# Patient Record
Sex: FEMALE | Race: WHITE | Employment: UNEMPLOYED | URBAN - METROPOLITAN AREA
[De-identification: names, ages, dates, MRNs, and addresses within clinical notes are randomized per-mention and may not be internally consistent; named-entity substitution may affect disease eponyms.]

---

## 2017-06-04 ENCOUNTER — ED HISTORICAL/CONVERTED ENCOUNTER (OUTPATIENT)
Dept: OTHER | Age: 32
End: 2017-06-04

## 2018-02-20 ENCOUNTER — OP HISTORICAL/CONVERTED ENCOUNTER (OUTPATIENT)
Dept: OTHER | Age: 33
End: 2018-02-20

## 2019-02-21 ENCOUNTER — ANESTHESIA (OUTPATIENT)
Dept: MEDSURG UNIT | Age: 34
End: 2019-02-21
Payer: MEDICAID

## 2019-02-21 ENCOUNTER — ANESTHESIA EVENT (OUTPATIENT)
Dept: MEDSURG UNIT | Age: 34
End: 2019-02-21
Payer: MEDICAID

## 2019-02-21 ENCOUNTER — HOSPITAL ENCOUNTER (OUTPATIENT)
Age: 34
Setting detail: OUTPATIENT SURGERY
Discharge: HOME | End: 2019-02-21
Attending: PODIATRIST | Admitting: PODIATRIST
Payer: MEDICAID

## 2019-02-21 VITALS
HEIGHT: 66 IN | WEIGHT: 177.69 LBS | RESPIRATION RATE: 16 BRPM | BODY MASS INDEX: 28.56 KG/M2 | OXYGEN SATURATION: 98 % | HEART RATE: 58 BPM | DIASTOLIC BLOOD PRESSURE: 65 MMHG | TEMPERATURE: 97.5 F | SYSTOLIC BLOOD PRESSURE: 104 MMHG

## 2019-02-21 PROCEDURE — 74011250636 HC RX REV CODE- 250/636

## 2019-02-21 PROCEDURE — 74011250636 HC RX REV CODE- 250/636: Performed by: PODIATRIST

## 2019-02-21 PROCEDURE — 76210000046 HC AMBSU PH II REC FIRST 0.5 HR: Performed by: PODIATRIST

## 2019-02-21 PROCEDURE — 77030013479 HC CUF TRNQ COT DGT MARM -A: Performed by: PODIATRIST

## 2019-02-21 PROCEDURE — 76030000002 HC AMB SURG OR TIME FIRST 0.: Performed by: PODIATRIST

## 2019-02-21 PROCEDURE — 76210000038 HC AMBSU PH I REC 2.5 TO 3 HR: Performed by: PODIATRIST

## 2019-02-21 PROCEDURE — 74011000250 HC RX REV CODE- 250: Performed by: PODIATRIST

## 2019-02-21 PROCEDURE — 77030018836 HC SOL IRR NACL ICUM -A: Performed by: PODIATRIST

## 2019-02-21 PROCEDURE — 76060000073 HC AMB SURG ANES FIRST 0.5 HR: Performed by: PODIATRIST

## 2019-02-21 PROCEDURE — 74011000250 HC RX REV CODE- 250

## 2019-02-21 PROCEDURE — 77030020782 HC GWN BAIR PAWS FLX 3M -B

## 2019-02-21 PROCEDURE — 77030006689 HC BLD OPHTH BVR BD -A: Performed by: PODIATRIST

## 2019-02-21 RX ORDER — FENTANYL CITRATE 50 UG/ML
50 INJECTION, SOLUTION INTRAMUSCULAR; INTRAVENOUS AS NEEDED
Status: DISCONTINUED | OUTPATIENT
Start: 2019-02-21 | End: 2019-02-21 | Stop reason: HOSPADM

## 2019-02-21 RX ORDER — ONDANSETRON 2 MG/ML
4 INJECTION INTRAMUSCULAR; INTRAVENOUS AS NEEDED
Status: DISCONTINUED | OUTPATIENT
Start: 2019-02-21 | End: 2019-02-21 | Stop reason: HOSPADM

## 2019-02-21 RX ORDER — LAMOTRIGINE 25 MG/1
50 TABLET ORAL DAILY
COMMUNITY

## 2019-02-21 RX ORDER — ONDANSETRON 2 MG/ML
INJECTION INTRAMUSCULAR; INTRAVENOUS AS NEEDED
Status: DISCONTINUED | OUTPATIENT
Start: 2019-02-21 | End: 2019-02-21 | Stop reason: HOSPADM

## 2019-02-21 RX ORDER — VENLAFAXINE 37.5 MG/1
37.5 TABLET ORAL 3 TIMES DAILY
COMMUNITY
End: 2022-03-28 | Stop reason: ALTCHOICE

## 2019-02-21 RX ORDER — DEXMEDETOMIDINE HYDROCHLORIDE 4 UG/ML
INJECTION, SOLUTION INTRAVENOUS AS NEEDED
Status: DISCONTINUED | OUTPATIENT
Start: 2019-02-21 | End: 2019-02-21 | Stop reason: HOSPADM

## 2019-02-21 RX ORDER — SODIUM CHLORIDE 0.9 % (FLUSH) 0.9 %
5-40 SYRINGE (ML) INJECTION EVERY 8 HOURS
Status: DISCONTINUED | OUTPATIENT
Start: 2019-02-21 | End: 2019-02-21 | Stop reason: HOSPADM

## 2019-02-21 RX ORDER — LIDOCAINE HYDROCHLORIDE 20 MG/ML
INJECTION, SOLUTION EPIDURAL; INFILTRATION; INTRACAUDAL; PERINEURAL AS NEEDED
Status: DISCONTINUED | OUTPATIENT
Start: 2019-02-21 | End: 2019-02-21 | Stop reason: HOSPADM

## 2019-02-21 RX ORDER — ESTRADIOL 0.1 MG/D
1 PATCH TRANSDERMAL 2 TIMES WEEKLY
COMMUNITY
End: 2022-03-28 | Stop reason: ALTCHOICE

## 2019-02-21 RX ORDER — IBUPROFEN 800 MG/1
TABLET ORAL
COMMUNITY
End: 2022-09-19

## 2019-02-21 RX ORDER — MIDAZOLAM HYDROCHLORIDE 1 MG/ML
0.5 INJECTION, SOLUTION INTRAMUSCULAR; INTRAVENOUS
Status: DISCONTINUED | OUTPATIENT
Start: 2019-02-21 | End: 2019-02-21 | Stop reason: HOSPADM

## 2019-02-21 RX ORDER — SODIUM CHLORIDE 0.9 % (FLUSH) 0.9 %
5-40 SYRINGE (ML) INJECTION AS NEEDED
Status: DISCONTINUED | OUTPATIENT
Start: 2019-02-21 | End: 2019-02-21 | Stop reason: HOSPADM

## 2019-02-21 RX ORDER — LIDOCAINE HYDROCHLORIDE 10 MG/ML
0.1 INJECTION, SOLUTION EPIDURAL; INFILTRATION; INTRACAUDAL; PERINEURAL AS NEEDED
Status: DISCONTINUED | OUTPATIENT
Start: 2019-02-21 | End: 2019-02-21 | Stop reason: HOSPADM

## 2019-02-21 RX ORDER — FLUVOXAMINE MALEATE 100 MG/1
100 TABLET, COATED ORAL EVERY EVENING
COMMUNITY

## 2019-02-21 RX ORDER — FENTANYL CITRATE 50 UG/ML
25 INJECTION, SOLUTION INTRAMUSCULAR; INTRAVENOUS
Status: DISCONTINUED | OUTPATIENT
Start: 2019-02-21 | End: 2019-02-21 | Stop reason: HOSPADM

## 2019-02-21 RX ORDER — CLINDAMYCIN PHOSPHATE 600 MG/50ML
600 INJECTION INTRAVENOUS ONCE
Status: COMPLETED | OUTPATIENT
Start: 2019-02-21 | End: 2019-02-21

## 2019-02-21 RX ORDER — OXYCODONE HYDROCHLORIDE 5 MG/1
5 TABLET ORAL AS NEEDED
Status: DISCONTINUED | OUTPATIENT
Start: 2019-02-21 | End: 2019-02-21 | Stop reason: HOSPADM

## 2019-02-21 RX ORDER — PROPOFOL 10 MG/ML
INJECTION, EMULSION INTRAVENOUS AS NEEDED
Status: DISCONTINUED | OUTPATIENT
Start: 2019-02-21 | End: 2019-02-21 | Stop reason: HOSPADM

## 2019-02-21 RX ORDER — SODIUM CHLORIDE, SODIUM LACTATE, POTASSIUM CHLORIDE, CALCIUM CHLORIDE 600; 310; 30; 20 MG/100ML; MG/100ML; MG/100ML; MG/100ML
INJECTION, SOLUTION INTRAVENOUS
Status: DISCONTINUED | OUTPATIENT
Start: 2019-02-21 | End: 2019-02-21 | Stop reason: HOSPADM

## 2019-02-21 RX ORDER — MORPHINE SULFATE 10 MG/ML
2 INJECTION, SOLUTION INTRAMUSCULAR; INTRAVENOUS
Status: DISCONTINUED | OUTPATIENT
Start: 2019-02-21 | End: 2019-02-21 | Stop reason: HOSPADM

## 2019-02-21 RX ORDER — DEXAMETHASONE SODIUM PHOSPHATE 4 MG/ML
INJECTION, SOLUTION INTRA-ARTICULAR; INTRALESIONAL; INTRAMUSCULAR; INTRAVENOUS; SOFT TISSUE AS NEEDED
Status: DISCONTINUED | OUTPATIENT
Start: 2019-02-21 | End: 2019-02-21 | Stop reason: HOSPADM

## 2019-02-21 RX ORDER — QUETIAPINE FUMARATE 25 MG/1
753 TABLET, FILM COATED ORAL
COMMUNITY
End: 2022-03-28 | Stop reason: ALTCHOICE

## 2019-02-21 RX ORDER — SODIUM CHLORIDE, SODIUM LACTATE, POTASSIUM CHLORIDE, CALCIUM CHLORIDE 600; 310; 30; 20 MG/100ML; MG/100ML; MG/100ML; MG/100ML
125 INJECTION, SOLUTION INTRAVENOUS CONTINUOUS
Status: DISCONTINUED | OUTPATIENT
Start: 2019-02-21 | End: 2019-02-21 | Stop reason: HOSPADM

## 2019-02-21 RX ORDER — MIDAZOLAM HYDROCHLORIDE 1 MG/ML
1 INJECTION, SOLUTION INTRAMUSCULAR; INTRAVENOUS AS NEEDED
Status: DISCONTINUED | OUTPATIENT
Start: 2019-02-21 | End: 2019-02-21 | Stop reason: HOSPADM

## 2019-02-21 RX ORDER — DIPHENHYDRAMINE HYDROCHLORIDE 50 MG/ML
12.5 INJECTION, SOLUTION INTRAMUSCULAR; INTRAVENOUS AS NEEDED
Status: DISCONTINUED | OUTPATIENT
Start: 2019-02-21 | End: 2019-02-21 | Stop reason: HOSPADM

## 2019-02-21 RX ORDER — SODIUM CHLORIDE 9 MG/ML
25 INJECTION, SOLUTION INTRAVENOUS CONTINUOUS
Status: DISCONTINUED | OUTPATIENT
Start: 2019-02-21 | End: 2019-02-21 | Stop reason: HOSPADM

## 2019-02-21 RX ORDER — SILVER SULFADIAZINE 10 G/1000G
CREAM TOPICAL DAILY
Status: COMPLETED | OUTPATIENT
Start: 2019-02-22 | End: 2019-02-21

## 2019-02-21 RX ORDER — MIDAZOLAM HYDROCHLORIDE 1 MG/ML
INJECTION, SOLUTION INTRAMUSCULAR; INTRAVENOUS AS NEEDED
Status: DISCONTINUED | OUTPATIENT
Start: 2019-02-21 | End: 2019-02-21 | Stop reason: HOSPADM

## 2019-02-21 RX ORDER — FENTANYL CITRATE 50 UG/ML
INJECTION, SOLUTION INTRAMUSCULAR; INTRAVENOUS AS NEEDED
Status: DISCONTINUED | OUTPATIENT
Start: 2019-02-21 | End: 2019-02-21 | Stop reason: HOSPADM

## 2019-02-21 RX ORDER — SODIUM CHLORIDE, SODIUM LACTATE, POTASSIUM CHLORIDE, CALCIUM CHLORIDE 600; 310; 30; 20 MG/100ML; MG/100ML; MG/100ML; MG/100ML
25 INJECTION, SOLUTION INTRAVENOUS CONTINUOUS
Status: DISCONTINUED | OUTPATIENT
Start: 2019-02-21 | End: 2019-02-21 | Stop reason: HOSPADM

## 2019-02-21 RX ORDER — HYDROMORPHONE HYDROCHLORIDE 1 MG/ML
0.2 INJECTION, SOLUTION INTRAMUSCULAR; INTRAVENOUS; SUBCUTANEOUS
Status: DISCONTINUED | OUTPATIENT
Start: 2019-02-21 | End: 2019-02-21 | Stop reason: HOSPADM

## 2019-02-21 RX ADMIN — PROPOFOL 50 MG: 10 INJECTION, EMULSION INTRAVENOUS at 13:07

## 2019-02-21 RX ADMIN — CLINDAMYCIN PHOSPHATE 600 MG: 600 INJECTION, SOLUTION INTRAVENOUS at 13:09

## 2019-02-21 RX ADMIN — FENTANYL CITRATE 25 MCG: 50 INJECTION, SOLUTION INTRAMUSCULAR; INTRAVENOUS at 13:07

## 2019-02-21 RX ADMIN — PROPOFOL 50 MG: 10 INJECTION, EMULSION INTRAVENOUS at 13:05

## 2019-02-21 RX ADMIN — LIDOCAINE HYDROCHLORIDE 40 MG: 20 INJECTION, SOLUTION EPIDURAL; INFILTRATION; INTRACAUDAL; PERINEURAL at 13:05

## 2019-02-21 RX ADMIN — MIDAZOLAM HYDROCHLORIDE 3 MG: 1 INJECTION, SOLUTION INTRAMUSCULAR; INTRAVENOUS at 12:59

## 2019-02-21 RX ADMIN — FENTANYL CITRATE 25 MCG: 50 INJECTION, SOLUTION INTRAMUSCULAR; INTRAVENOUS at 13:22

## 2019-02-21 RX ADMIN — FENTANYL CITRATE 25 MCG: 50 INJECTION, SOLUTION INTRAMUSCULAR; INTRAVENOUS at 13:09

## 2019-02-21 RX ADMIN — MIDAZOLAM HYDROCHLORIDE 2 MG: 1 INJECTION, SOLUTION INTRAMUSCULAR; INTRAVENOUS at 13:05

## 2019-02-21 RX ADMIN — SODIUM CHLORIDE, SODIUM LACTATE, POTASSIUM CHLORIDE, CALCIUM CHLORIDE: 600; 310; 30; 20 INJECTION, SOLUTION INTRAVENOUS at 12:45

## 2019-02-21 RX ADMIN — PROPOFOL 50 MG: 10 INJECTION, EMULSION INTRAVENOUS at 13:09

## 2019-02-21 RX ADMIN — DEXAMETHASONE SODIUM PHOSPHATE 4 MG: 4 INJECTION, SOLUTION INTRA-ARTICULAR; INTRALESIONAL; INTRAMUSCULAR; INTRAVENOUS; SOFT TISSUE at 13:12

## 2019-02-21 RX ADMIN — ONDANSETRON 4 MG: 2 INJECTION INTRAMUSCULAR; INTRAVENOUS at 13:12

## 2019-02-21 RX ADMIN — DEXMEDETOMIDINE HYDROCHLORIDE 4 MCG: 4 INJECTION, SOLUTION INTRAVENOUS at 13:16

## 2019-02-21 NOTE — ANESTHESIA PREPROCEDURE EVALUATION
Anesthetic History No history of anesthetic complications Review of Systems / Medical History Patient summary reviewed, nursing notes reviewed and pertinent labs reviewed Pulmonary Within defined limits Neuro/Psych Within defined limits Cardiovascular Exercise tolerance: >4 METS 
  
GI/Hepatic/Renal 
  
GERD Endo/Other Within defined limits Other Findings Physical Exam 
 
Airway Mallampati: I 
TM Distance: > 6 cm Neck ROM: normal range of motion Mouth opening: Normal 
 
 Cardiovascular Rhythm: regular Rate: normal 
 
 
 
 Dental 
No notable dental hx Pulmonary Breath sounds clear to auscultation Abdominal 
 
 
 
 Other Findings Anesthetic Plan ASA: 2 Anesthesia type: MAC Induction: Intravenous Anesthetic plan and risks discussed with: Patient

## 2019-02-21 NOTE — PERIOP NOTES
Patient remains extremely sleepy. Blood pressure increasing to 100/55. When awakened patient states \" I  doesn't sleep at night so that is why I am so tired. \"

## 2019-02-21 NOTE — PERIOP NOTES
tourni-cot used for tourniquet  Left foot on @ 1316, off @ 1323  Right foot on @ 1316, off  @  [phone]

## 2019-02-21 NOTE — ANESTHESIA POSTPROCEDURE EVALUATION
Post-Anesthesia Evaluation and Assessment Patient: Tom Simth MRN: [id number]  SSN: [id number] YOB: 1985  Age: 35 y.o. Sex: female I have evaluated the patient and they are stable and ready for discharge from the PACU. Cardiovascular Function/Vital Signs Visit Vitals BP 91/58 Pulse (!) 40 Temp 36.4 °C (97.5 °F) Resp 17 Ht 5' 6\" (1.676 m) Wt 80.6 kg (177 lb 11.1 oz) SpO2 98% BMI 28.68 kg/m² Patient is status post MAC anesthesia for Procedure(s): MATRIXECTOMY OF BILATERAL BIG TOES. Nausea/Vomiting: None Postoperative hydration reviewed and adequate. Pain: 
Pain Scale 1: Numeric (0 - 10) (02/21/19 1329) Pain Intensity 1: 0 (02/21/19 1329) Managed Neurological Status:  
Neuro (WDL): Exceptions to WDL (02/21/19 1337) Neuro Neurologic State: Sleeping;Eyes open to stimulus (02/21/19 1337) At baseline Mental Status, Level of Consciousness: Alert and  oriented to person, place, and time Pulmonary Status:  
O2 Device: Nasal cannula (02/21/19 1332) Adequate oxygenation and airway patent Complications related to anesthesia: None Post-anesthesia assessment completed. No concerns Signed By: Jose Nails MD   
 February 21, 2019 Procedure(s): MATRIXECTOMY OF BILATERAL BIG TOES. 
 
<BSHSIANPOST> Visit Vitals BP 91/58 Pulse (!) 40 Temp 36.4 °C (97.5 °F) Resp 17 Ht 5' 6\" (1.676 m) Wt 80.6 kg (177 lb 11.1 oz) SpO2 98% BMI 28.68 kg/m²

## 2019-02-21 NOTE — BRIEF OP NOTE
BRIEF OPERATIVE NOTE    Date of Procedure: 2/21/2019   Preoperative Diagnosis:  BILATERAL INGROWN NAILS  Postoperative Diagnosis:  BILATERAL INGROWN NAILS    Procedure(s):  MATRIXECTOMY OF BILATERAL BIG TOES  Surgeon(s) and Role:     Lyla Montano MD - Primary         Surgical Assistant: none    Surgical Staff:  Circ-1: Arturo Laws RN  Scrub RN-1: Latesha Brown RN  Event Time In Time Out   Incision Start 1316    Incision Close 1323      Anesthesia: MAC   Estimated Blood Loss: occ  Specimens: * No specimens in log *   Findings: Ingrown nails right and left big toes   Complications:  none  Implants: * No implants in log *

## 2019-02-21 NOTE — DISCHARGE INSTRUCTIONS
[address] [place] [address], [facility] [address]        Phone: [phone]   Fax: [phone]    Megha Salgado D.P.M., D.P.M. INSTRUCTIONS FOR CARE OF POST OPERATIVE TOENAIL SURGERY AND GENERAL INFORMATION        1. Go directly home and elevate your foot/feet and rest for the first day or two.    2.  Take prescribed pain medication as needed. If the pain is mild, you may take either Tylenol or Aspirin. Your toenail(s) has/have been partially/totally removed. The numbness from the injection(s) should last between six to eight hours. After the feeling returns, you MAY or MAY NOT have pain severe enough to require medication. It is very important that you take any prescribed medication. You must take it as instructed by your doctor and DO NOT DRINK ALCOHOL or [address]. You may notice some blood on the dressings. Please do not be alarmed. This is normal.    Your nail root has been chemically burned and like all burns, the surgical site will drain and ooze for 4-6 weeks after surgery. In some cases, this may occur for longer than  6 weeks. The nail lips may also become red/swollen. When you start your soaking, please remember to use clean gauze and Q-tips to wipe the debris (dead tissue/scab); this will be uncomfortable the first couple of times. (SEE EPSOM SALT SOAK INSTRUCTIONS)    You must wear sandals, flip-flops, or old shoes that can be cut out for the first few days while the toe(s) are tender. Too much pressure can cause pain. You may and should become as physically active as possible after the first 2-3 days. The \"healing time\" varies considerable, as does the amount of pain you may have.  Swelling of the whole foot, heat, red streaks up the foot/leg, calf pain and/or tender lumps behind the knee/gt·oin, severe bleeding or feeling ill may be serious signs and you should contact your doctor immediately if any of the symptoms should occur. If you have any other questions concerning your nail procedure, please do not hesitate to contact the office for assistance at [phone]. EPSOM SALT SOAK INSTRUCTIONS        1. On the day after your surgery, remove the dressing and the packing gauze in the nail(s) groove and then soak in [facility] water. 2.  Put two (2) tablespoons of Epsom Salt in one (1) quart of warm water,  NOT HOT. 3.  Soak your foot/feet for 20 minutes each time. 4.  You must do this twice a day. 5.  Always make a fresh solution for each soak. 6.  Apply 1, 2, or 3 drops of Cortic-ND solution to the surgical site(s). 7.  Cover surgical site(s) with a clean Band-aid after each soak. 8.  Please note that as a safeguard to you, your specimen will be sent to a pathology laboratory for examination. You may shower/bathe after you have removed the original dressing. However, you must soak in [facility] after every shower/bath. EMERGENCY CONTACT:  BEEPER #[phone]        DISCHARGE SUMMARY from Nurse    PATIENT INSTRUCTIONS:    After general anesthesia or intravenous sedation, for 24 hours or while taking prescription Narcotics:  · Limit your activities  · Do not drive and operate hazardous machinery  · Do not make important personal or business decisions  · Do  not drink alcoholic beverages  · If you have not urinated within 8 hours after discharge, please contact your surgeon on call.     Report the following to your surgeon:  · Excessive pain, swelling, redness or odor of or around the surgical area  · Temperature over 100.5  · Nausea and vomiting lasting longer than 4 hours or if unable to take medications  · Any signs of decreased circulation or nerve impairment to extremity: change in color, persistent  numbness, tingling, coldness or increase pain  · Any questions    What to do at Home:  Recommended activity: Activity as tolerated and no driving for today and No driving while on analgesics,     If you experience any of the following symptoms ; as noted above, please follow up with . *  Please give a list of your current medications to your Primary Care Provider. *  Please update this list whenever your medications are discontinued, doses are      changed, or new medications (including over-the-counter products) are added. *  Please carry medication information at all times in case of emergency situations. These are general instructions for a healthy lifestyle:    No smoking/ No tobacco products/ Avoid exposure to second hand smoke  Surgeon General's Warning:  Quitting smoking now greatly reduces serious risk to your health. Obesity, smoking, and sedentary lifestyle greatly increases your risk for illness    A healthy diet, regular physical exercise & weight monitoring are important for maintaining a healthy lifestyle    You may be retaining fluid if you have a history of heart failure or if you experience any of the following symptoms:  Weight gain of 3 pounds or more overnight or 5 pounds in a week, increased swelling in our hands or feet or shortness of breath while lying flat in bed. Please call your doctor as soon as you notice any of these symptoms; do not wait until your next office visit. Recognize signs and symptoms of STROKE:    F-face looks uneven    A-arms unable to move or move unevenly    S-speech slurred or non-existent    T-time-call 911 as soon as signs and symptoms begin-DO NOT go       Back to bed or wait to see if you get better-TIME IS BRAIN. Warning Signs of HEART ATTACK     Call 911 if you have these symptoms:   Chest discomfort. Most heart attacks involve discomfort in the center of the chest that lasts more than a few minutes, or that goes away and comes back. It can feel like uncomfortable pressure, squeezing, fullness, or pain.    Discomfort in other areas of the upper body. Symptoms can include pain or discomfort in one or both arms, the back, neck, jaw, or stomach.  Shortness of breath with or without chest discomfort.  Other signs may include breaking out in a cold sweat, nausea, or lightheadedness. Don't wait more than five minutes to call 911 - MINUTES MATTER! Fast action can save your life. Calling 911 is almost always the fastest way to get lifesaving treatment. Emergency Medical Services staff can begin treatment when they arrive -- up to an hour sooner than if someone gets to the hospital by car. The discharge information has been reviewed with the patient and caregiver. The patient and caregiver verbalized understanding. Discharge medications reviewed with the patient and caregiver and appropriate educational materials and side effects teaching were provided.   ___________________________________________________________________________________________________________________________________

## 2019-02-22 NOTE — OP NOTES
[address]  OPERATIVE REPORT    Name:  Susana Anthony  MR#:  [id number]  :  1985  ACCOUNT #:  [de-identified]  DATE OF SERVICE:  2019      PREOPERATIVE DIAGNOSIS:  Chronic and painful ingrown toenails of the right and left great toes, medial and lateral borders. POSTOPERATIVE DIAGNOSIS:  Chronic and painful ingrown toenails of the right and left great toes, medial and lateral borders. PROCEDURE PERFORMED:  Matrixectomy of the right and left great toes, medial and lateral borders. SURGEON:  Iris Castro MD    FIRST ASSISTANT:  None. SECOND ASSISTANT:  None. ANESTHESIA:  Marcaine and Xylocaine mixture along with IV sedation. ANESTHETIST:  Dr. Tamy Cazares along with a nurse anesthetist.    SCRUB NURSE:  Abe Hubbard. SECOND NURSE:  Pennie Schaeffer. COMPLICATIONS:  None. ESTIMATED BLOOD LOSS:  0 mL. TOURNIQUET:  A Tourni-Cot anchored to the base of the right and left great toes. TOURNIQUET TIME:  Approximately 8 minutes on each toes. SPECIMENS REMOVED:  Ingrown toenails of the right and left great toes, medial and lateral borders. IMPLANTS:  None. INDICATIONS FOR SURGERY:  This 80-year-old female presents with a history of very chronic and painful ingrown toenails of the great toes, medial and lateral borders. The patient states the condition had been there for a number of years causing her debilitation and pain upon ambulating and wearing shoes. Conservative therapy such as use of wide fitting shoes, use of over-the-counter medications have not remedied her condition asymptomatic. The patient elected to have surgical intervention. DESCRIPTION OF OPERATION:  After there was no contraindication to this patient getting surgery, she was brought to [facility] Unit on the 7th floor, and placed in dorsal recumbent position.   Anesthesia was administered using Marcaine and Xylocaine mixture infiltrated on the base of the right and left great toes. Also IV anesthesia was administered by the Anesthesia Department. Approximately, 10 mL of Marcaine and Xylocaine mixture was utilized to both the right and left great toes. Both great toes were then prepped in the usual sterile manner. Hemostasis was achieved using a Tourni-Cot anchored to the base of the great toes. The toes were then fully exsanguinated. Attention was directed to the right great toe over the medial and lateral borders using a Nail Connie elevator was then used to elevate the hyper and hyponychium. An English anvil was then used to cut the nail to its most proximal portion. A 62 blade was used to complete the excision of nail roots and a mosquito hemostat was then used to remove the offending nail borders. The remaining nail borders were then bluntly dissected and suctioned out. Application of carbolic acid or phenol was applied to the nail root, medial and lateral borders at 30-second intervals at 3 intervals. Attention was then directed to the left great toe of the medial and lateral borders where similar procedure was performed. All wounds were then thoroughly irrigated with alcohol, packed with iodoform gauze and Silvadene cream.  Application of dressings of 3x3's, 2-inch Berna and 2-inch Ace Bandage. The Tourni-Cots were released and perfusion to the great toes were noted. The patient tolerated the anesthesia and procedure well, left the operating room to the recovery room in fine condition where she was advised on the postoperative care, elevation and ice for the first 72 hours, pain meds, Tylenol over-the-counter to be taken and strict postoperative instructions. The patient advised to follow in the office in three days.         EARNEST SHAH MD [id number]  D:  02/21/2019 16:55  T:  02/22/2019 4:07  JOB #:  [id number]  CC:  Aparna Cabral MD

## 2019-05-22 RX ORDER — CEFAZOLIN SODIUM/WATER 2 G/20 ML
2 SYRINGE (ML) INTRAVENOUS ONCE
Status: CANCELLED | OUTPATIENT
Start: 2019-05-22 | End: 2019-05-22

## 2019-05-23 ENCOUNTER — ANESTHESIA (OUTPATIENT)
Dept: MEDSURG UNIT | Age: 34
End: 2019-05-23
Payer: MEDICAID

## 2019-05-23 ENCOUNTER — HOSPITAL ENCOUNTER (OUTPATIENT)
Age: 34
Setting detail: OUTPATIENT SURGERY
Discharge: HOME | End: 2019-05-23
Attending: PODIATRIST | Admitting: PODIATRIST
Payer: MEDICAID

## 2019-05-23 ENCOUNTER — ANESTHESIA EVENT (OUTPATIENT)
Dept: MEDSURG UNIT | Age: 34
End: 2019-05-23
Payer: MEDICAID

## 2019-05-23 VITALS
HEART RATE: 56 BPM | OXYGEN SATURATION: 99 % | SYSTOLIC BLOOD PRESSURE: 98 MMHG | TEMPERATURE: 97.5 F | DIASTOLIC BLOOD PRESSURE: 61 MMHG | RESPIRATION RATE: 11 BRPM

## 2019-05-23 DIAGNOSIS — M77.51 BONE SPUR OF TOE OF RIGHT FOOT: Primary | ICD-10-CM

## 2019-05-23 PROBLEM — M77.52 BONE SPUR OF TOE OF LEFT FOOT: Status: ACTIVE | Noted: 2019-05-23

## 2019-05-23 LAB
HCG UR QL: NEGATIVE
HGB BLD-MCNC: 13.2 G/DL (ref 11.5–16)

## 2019-05-23 PROCEDURE — 77030002916 HC SUT ETHLN J&J -A: Performed by: PODIATRIST

## 2019-05-23 PROCEDURE — 76030000001 HC AMB SURG OR TIME 1 TO 1.5: Performed by: PODIATRIST

## 2019-05-23 PROCEDURE — 76060000062 HC AMB SURG ANES 1 TO 1.5 HR: Performed by: PODIATRIST

## 2019-05-23 PROCEDURE — 74011000250 HC RX REV CODE- 250

## 2019-05-23 PROCEDURE — 74011250636 HC RX REV CODE- 250/636: Performed by: ANESTHESIOLOGY

## 2019-05-23 PROCEDURE — 77030000032 HC CUF TRNQT ZIMM -B: Performed by: PODIATRIST

## 2019-05-23 PROCEDURE — 74011250636 HC RX REV CODE- 250/636

## 2019-05-23 PROCEDURE — 85018 HEMOGLOBIN: CPT

## 2019-05-23 PROCEDURE — 77030031139 HC SUT VCRL2 J&J -A: Performed by: PODIATRIST

## 2019-05-23 PROCEDURE — 74011000250 HC RX REV CODE- 250: Performed by: PODIATRIST

## 2019-05-23 PROCEDURE — 77030018836 HC SOL IRR NACL ICUM -A: Performed by: PODIATRIST

## 2019-05-23 PROCEDURE — 74011250637 HC RX REV CODE- 250/637: Performed by: ANESTHESIOLOGY

## 2019-05-23 PROCEDURE — 77030004427: Performed by: PODIATRIST

## 2019-05-23 PROCEDURE — 76210000037 HC AMBSU PH I REC 2 TO 2.5 HR: Performed by: PODIATRIST

## 2019-05-23 PROCEDURE — 77030020782 HC GWN BAIR PAWS FLX 3M -B

## 2019-05-23 PROCEDURE — 74011250636 HC RX REV CODE- 250/636: Performed by: PODIATRIST

## 2019-05-23 PROCEDURE — 81025 URINE PREGNANCY TEST: CPT

## 2019-05-23 RX ORDER — SODIUM CHLORIDE 0.9 % (FLUSH) 0.9 %
5-40 SYRINGE (ML) INJECTION AS NEEDED
Status: DISCONTINUED | OUTPATIENT
Start: 2019-05-23 | End: 2019-05-23 | Stop reason: HOSPADM

## 2019-05-23 RX ORDER — BUPIVACAINE HYDROCHLORIDE 5 MG/ML
INJECTION, SOLUTION EPIDURAL; INTRACAUDAL AS NEEDED
Status: DISCONTINUED | OUTPATIENT
Start: 2019-05-23 | End: 2019-05-23 | Stop reason: HOSPADM

## 2019-05-23 RX ORDER — SODIUM CHLORIDE, SODIUM LACTATE, POTASSIUM CHLORIDE, CALCIUM CHLORIDE 600; 310; 30; 20 MG/100ML; MG/100ML; MG/100ML; MG/100ML
125 INJECTION, SOLUTION INTRAVENOUS CONTINUOUS
Status: DISCONTINUED | OUTPATIENT
Start: 2019-05-23 | End: 2019-05-23 | Stop reason: HOSPADM

## 2019-05-23 RX ORDER — OXYCODONE AND ACETAMINOPHEN 5; 325 MG/1; MG/1
1 TABLET ORAL AS NEEDED
Status: DISCONTINUED | OUTPATIENT
Start: 2019-05-23 | End: 2019-05-23 | Stop reason: HOSPADM

## 2019-05-23 RX ORDER — ONDANSETRON 2 MG/ML
4 INJECTION INTRAMUSCULAR; INTRAVENOUS AS NEEDED
Status: DISCONTINUED | OUTPATIENT
Start: 2019-05-23 | End: 2019-05-23 | Stop reason: HOSPADM

## 2019-05-23 RX ORDER — LIDOCAINE HYDROCHLORIDE 20 MG/ML
INJECTION, SOLUTION EPIDURAL; INFILTRATION; INTRACAUDAL; PERINEURAL AS NEEDED
Status: DISCONTINUED | OUTPATIENT
Start: 2019-05-23 | End: 2019-05-23 | Stop reason: HOSPADM

## 2019-05-23 RX ORDER — MORPHINE SULFATE 10 MG/ML
2 INJECTION, SOLUTION INTRAMUSCULAR; INTRAVENOUS
Status: DISCONTINUED | OUTPATIENT
Start: 2019-05-23 | End: 2019-05-23 | Stop reason: HOSPADM

## 2019-05-23 RX ORDER — MIDAZOLAM HYDROCHLORIDE 1 MG/ML
0.5 INJECTION, SOLUTION INTRAMUSCULAR; INTRAVENOUS
Status: DISCONTINUED | OUTPATIENT
Start: 2019-05-23 | End: 2019-05-23 | Stop reason: HOSPADM

## 2019-05-23 RX ORDER — OXYCODONE AND ACETAMINOPHEN 5; 325 MG/1; MG/1
1 TABLET ORAL
Qty: 30 TAB | Refills: 0 | Status: SHIPPED | OUTPATIENT
Start: 2019-05-23 | End: 2019-05-26

## 2019-05-23 RX ORDER — ONDANSETRON 2 MG/ML
INJECTION INTRAMUSCULAR; INTRAVENOUS AS NEEDED
Status: DISCONTINUED | OUTPATIENT
Start: 2019-05-23 | End: 2019-05-23 | Stop reason: HOSPADM

## 2019-05-23 RX ORDER — PROPOFOL 10 MG/ML
INJECTION, EMULSION INTRAVENOUS AS NEEDED
Status: DISCONTINUED | OUTPATIENT
Start: 2019-05-23 | End: 2019-05-23 | Stop reason: HOSPADM

## 2019-05-23 RX ORDER — DEXAMETHASONE SODIUM PHOSPHATE 4 MG/ML
INJECTION, SOLUTION INTRA-ARTICULAR; INTRALESIONAL; INTRAMUSCULAR; INTRAVENOUS; SOFT TISSUE AS NEEDED
Status: DISCONTINUED | OUTPATIENT
Start: 2019-05-23 | End: 2019-05-23 | Stop reason: HOSPADM

## 2019-05-23 RX ORDER — DICYCLOMINE HYDROCHLORIDE 10 MG/1
10 CAPSULE ORAL
COMMUNITY
End: 2022-03-28 | Stop reason: ALTCHOICE

## 2019-05-23 RX ORDER — DIPHENHYDRAMINE HYDROCHLORIDE 50 MG/ML
12.5 INJECTION, SOLUTION INTRAMUSCULAR; INTRAVENOUS AS NEEDED
Status: DISCONTINUED | OUTPATIENT
Start: 2019-05-23 | End: 2019-05-23 | Stop reason: HOSPADM

## 2019-05-23 RX ORDER — SODIUM CHLORIDE 0.9 % (FLUSH) 0.9 %
5-40 SYRINGE (ML) INJECTION EVERY 8 HOURS
Status: DISCONTINUED | OUTPATIENT
Start: 2019-05-23 | End: 2019-05-23 | Stop reason: HOSPADM

## 2019-05-23 RX ORDER — ACETAMINOPHEN 325 MG/1
650 TABLET ORAL ONCE
Status: COMPLETED | OUTPATIENT
Start: 2019-05-23 | End: 2019-05-23

## 2019-05-23 RX ORDER — SODIUM CHLORIDE 9 MG/ML
25 INJECTION, SOLUTION INTRAVENOUS CONTINUOUS
Status: DISCONTINUED | OUTPATIENT
Start: 2019-05-23 | End: 2019-05-23 | Stop reason: HOSPADM

## 2019-05-23 RX ORDER — DEXMEDETOMIDINE HYDROCHLORIDE 4 UG/ML
INJECTION, SOLUTION INTRAVENOUS AS NEEDED
Status: DISCONTINUED | OUTPATIENT
Start: 2019-05-23 | End: 2019-05-23 | Stop reason: HOSPADM

## 2019-05-23 RX ORDER — FENTANYL CITRATE 50 UG/ML
50 INJECTION, SOLUTION INTRAMUSCULAR; INTRAVENOUS AS NEEDED
Status: DISCONTINUED | OUTPATIENT
Start: 2019-05-23 | End: 2019-05-23 | Stop reason: HOSPADM

## 2019-05-23 RX ORDER — LIDOCAINE HYDROCHLORIDE 10 MG/ML
0.1 INJECTION, SOLUTION EPIDURAL; INFILTRATION; INTRACAUDAL; PERINEURAL AS NEEDED
Status: DISCONTINUED | OUTPATIENT
Start: 2019-05-23 | End: 2019-05-23 | Stop reason: HOSPADM

## 2019-05-23 RX ORDER — MIDAZOLAM HYDROCHLORIDE 1 MG/ML
1 INJECTION, SOLUTION INTRAMUSCULAR; INTRAVENOUS AS NEEDED
Status: DISCONTINUED | OUTPATIENT
Start: 2019-05-23 | End: 2019-05-23 | Stop reason: HOSPADM

## 2019-05-23 RX ORDER — FENTANYL CITRATE 50 UG/ML
25 INJECTION, SOLUTION INTRAMUSCULAR; INTRAVENOUS
Status: DISCONTINUED | OUTPATIENT
Start: 2019-05-23 | End: 2019-05-23 | Stop reason: HOSPADM

## 2019-05-23 RX ORDER — HYDROMORPHONE HYDROCHLORIDE 1 MG/ML
0.2 INJECTION, SOLUTION INTRAMUSCULAR; INTRAVENOUS; SUBCUTANEOUS
Status: DISCONTINUED | OUTPATIENT
Start: 2019-05-23 | End: 2019-05-23 | Stop reason: HOSPADM

## 2019-05-23 RX ORDER — CLINDAMYCIN PHOSPHATE 600 MG/50ML
600 INJECTION INTRAVENOUS ONCE
Status: DISCONTINUED | OUTPATIENT
Start: 2019-05-23 | End: 2019-05-23 | Stop reason: HOSPADM

## 2019-05-23 RX ORDER — PROPOFOL 10 MG/ML
INJECTION, EMULSION INTRAVENOUS
Status: DISCONTINUED | OUTPATIENT
Start: 2019-05-23 | End: 2019-05-23 | Stop reason: HOSPADM

## 2019-05-23 RX ADMIN — DEXMEDETOMIDINE HYDROCHLORIDE 4 MCG: 4 INJECTION, SOLUTION INTRAVENOUS at 15:20

## 2019-05-23 RX ADMIN — ONDANSETRON 4 MG: 2 INJECTION INTRAMUSCULAR; INTRAVENOUS at 16:09

## 2019-05-23 RX ADMIN — PROPOFOL 100 MCG/KG/MIN: 10 INJECTION, EMULSION INTRAVENOUS at 15:20

## 2019-05-23 RX ADMIN — DEXAMETHASONE SODIUM PHOSPHATE 4 MG: 4 INJECTION, SOLUTION INTRA-ARTICULAR; INTRALESIONAL; INTRAMUSCULAR; INTRAVENOUS; SOFT TISSUE at 15:27

## 2019-05-23 RX ADMIN — DEXMEDETOMIDINE HYDROCHLORIDE 4 MCG: 4 INJECTION, SOLUTION INTRAVENOUS at 15:25

## 2019-05-23 RX ADMIN — MIDAZOLAM HYDROCHLORIDE 2 MG: 1 INJECTION, SOLUTION INTRAMUSCULAR; INTRAVENOUS at 15:16

## 2019-05-23 RX ADMIN — SODIUM CHLORIDE, POTASSIUM CHLORIDE, SODIUM LACTATE AND CALCIUM CHLORIDE: 600; 310; 30; 20 INJECTION, SOLUTION INTRAVENOUS at 15:16

## 2019-05-23 RX ADMIN — DEXMEDETOMIDINE HYDROCHLORIDE 4 MCG: 4 INJECTION, SOLUTION INTRAVENOUS at 15:33

## 2019-05-23 RX ADMIN — LIDOCAINE HYDROCHLORIDE 40 MG: 20 INJECTION, SOLUTION EPIDURAL; INFILTRATION; INTRACAUDAL; PERINEURAL at 15:20

## 2019-05-23 RX ADMIN — ACETAMINOPHEN 650 MG: 325 TABLET ORAL at 13:42

## 2019-05-23 RX ADMIN — PROPOFOL 50 MG: 10 INJECTION, EMULSION INTRAVENOUS at 15:25

## 2019-05-23 RX ADMIN — FENTANYL CITRATE 25 MCG: 50 INJECTION, SOLUTION INTRAMUSCULAR; INTRAVENOUS at 15:32

## 2019-05-23 RX ADMIN — PROPOFOL 100 MG: 10 INJECTION, EMULSION INTRAVENOUS at 15:20

## 2019-05-23 NOTE — ROUTINE PROCESS
Patient: Garfield Da Silva MRN: [id number]  SSN: [id number]   YOB: 1985  Age: 35 y.o. Sex: female     Patient is status post Procedure(s): HEMIPHALANGECTOMY RIGHT AND LEFT BIG TOES. Surgeon(s) and Role:     Calli Bello MD - Primary    Local/Dose/Irrigation:                    Peripheral IV 05/23/19 Right Antecubital (Active)   Site Assessment Clean, dry, & intact 5/23/2019  1:57 PM   Phlebitis Assessment 0 5/23/2019  1:57 PM   Infiltration Assessment 0 5/23/2019  1:57 PM   Dressing Status Clean, dry, & intact 5/23/2019  1:57 PM   Dressing Type Tape;Transparent 5/23/2019  1:57 PM   Hub Color/Line Status Blue; Infusing 5/23/2019  1:57 PM                           Dressing/Packing:  Wound Foot-Dressing Type: (XEROFORM, 4X4, JACOB, ACE WRAP) (05/23/19 1500)    Splint/Cast:  ]    Other:

## 2019-05-23 NOTE — ANESTHESIA POSTPROCEDURE EVALUATION
Procedure(s): HEMIPHALANGECTOMY RIGHT AND LEFT BIG TOES. MAC    Anesthesia Post Evaluation        Patient location during evaluation: PACU  Patient participation: complete - patient participated  Level of consciousness: awake  Pain management: adequate  Airway patency: patent  Anesthetic complications: no  Cardiovascular status: hemodynamically stable  Respiratory status: acceptable  Hydration status: acceptable  Comments: I have seen and evaluated the patient. The patient is ready for PACU discharge. [address], DO                         Vitals Value Taken Time   BP 92/61 5/23/2019  4:35 PM   Temp 36.4 °C (97.5 °F) 5/23/2019  4:31 PM   Pulse 41 5/23/2019  4:46 PM   Resp 14 5/23/2019  4:46 PM   SpO2 100 % 5/23/2019  4:46 PM   Vitals shown include unvalidated device data.

## 2019-05-23 NOTE — BRIEF OP NOTE
BRIEF OPERATIVE NOTE    Date of Procedure: 5/23/2019   Preoperative Diagnosis: DIFFICULTY WALKING  Postoperative Diagnosis: PAINFUL CONDYLE, BILATERAL FOOT    Procedure(s):   HEMIPHALANGECTOMY RIGHT AND LEFT BIG TOES  Surgeon(s) and Role:     Nohemi Sifuentes MD - Primary         Surgical Assistant: Lydia Pedersen DPM    Surgical Staff:  Circ-1: Ce Lugo RN  Scrub Tech-Relief: Olivia Davis  Scrub RN-1: Joaquina Oconnell RN  Event Time In Time Out   Incision Start 1540    Incision Close       Anesthesia: MAC   Estimated Blood Loss: 0cc  Specimens: * No specimens in log *   Findings: Enlarge condyle of the proximal and distal phalanx right and left big toes   Complications: none  Implants: * No implants in log *

## 2019-05-24 NOTE — OP NOTES
[address]   OPERATIVE REPORT    Name:  Yennifer Bolden  MR#:  [id number]  :  1985  ACCOUNT #:  [de-identified]  DATE OF SERVICE:  2019    PREOPERATIVE DIAGNOSES:  Chronic and painful condyles of the medial aspect of the right and left great toes around the base of the distal phalanx and the head of the proximal phalanx, medial aspect. POSTOPERATIVE DIAGNOSES:  Chronic and painful condyles of the medial aspect of the right and left great toes around the base of the distal phalanx and the head of the proximal phalanx, medial aspect. PROCEDURE PERFORMED:  Condylectomy of the base of the distal phalanx and head of the proximal phalanx, medial aspect. SURGEON:  Tomasz Quigley MD    FIRST ASSISTANT:  Sandhya Yang DPM    SECOND ASSISTANT:  None. ANESTHESIA:  Marcaine and Xylocaine mixture along with IV sedation. ANESTHETISTS:  Dr. Carolina Leyva along with a nurse anesthetist.    SCRUB NURSE:  Terry Alex. CIRCULATING NURSE:  Alyssia Garibay. COMPLICATIONS:  None. SPECIMENS REMOVED:  Bone due to repair of painful bone spurs right and left big toes    IMPLANTS:  None. ESTIMATED BLOOD LOSS:  0 mL. TOURNIQUET:  Ankle tourniquet inflated to 250 mmHg pressure. TOURNIQUET TIME:  On the right was approximately 17 minutes and on the left was 18 minutes. INDICATIONS FOR SURGERY:  This 40-year-old female presents with a history of very chronic and painful condyles of the medial aspect of the right and left great toes. The patient said this condition has been there for a number of years causing her debilitation and pain upon ambulating and wearing shoes. Conservative therapies such as wider-fitting shoes, use of pads, have not remedied her condition asymptomatic. The patient elected on surgical intervention.       DESCRIPTION OF OPERATION:  After there were no contraindications to this patient for surgery, she was brought to the [facility] Ambulatory Surgical Unit and placed in dorsal recumbent position. Anesthesia was administered using Marcaine and Xylocaine mixture, infiltrated on to the base of the right and left great toes. Also, IV anesthesia was administered by the Anesthesia Department. Approximately, 10 mL of Marcaine and Xylocaine mixture was utilized. Both feet were then prepped and draped in usual sterile manner. Hemostasis was achieved using ankle tourniquet inflated to 250 mmHg pressure. The foot was then fully exsanguinated. Attention was directed to the right great toe on the medial aspect where a 4.5 dorsomedial incision was then made directly over the head of the proximal phalanx and distal phalanx. The incision was deepened using sharp and blunt dissection being careful to preserve and retract all neurovascular structures. All bleeders were noted and bovied in usual fashion. The capsular structures were then noted at the base of the distal phalanx and head of the proximal phalanx of the right great toe. A 2.5 periosteotomy was then performed directly into the joint area. The periosteal and capsular structures were then entered sharply and bluntly retracting medially and laterally. Using micro-oscillating saw, the medial eminence of the head of the proximal phalanx and base of the distal phalanx was excised. The area was then rongeured and rasped using a power shaq and micro-oscillating saw. All wounds had been thoroughly flushed and irrigated with saline solution. The wound was then further evaluated for other pathology, however, none was found at this point. Attention was directed to the left great toe where a similar procedure was performed. All wounds had been thoroughly flushed and irrigated with saline solution. All capsular structures were then approximated using 4-0 Vicryl in simple interrupted-type fashion and also subcutaneous tissue was coapted using 4-0 Vicryl in a horizontal type mattress sutures.   The skin was closed with 4-0 nylon in horizontal type mattress sutures. Approximately 0.5 mL of dexamethasone mixed with 2 mL of 0.5% of Marcaine plain was instilled throughout the wounds. Dressing was done with Xeroform gauze, saturated 3 x 3 with Betadine, dry sterile 3 x 3, 3-inch Berna, and 3-inch bandage. The tourniquet was released and perfusion to the great toes were noted. The patient tolerated the anesthesia and procedure well, left the operating suite to recovery room in fine condition where she was advised on the postoperative care, i.e., ice and elevation for the first 72 hours, pain medications, Percocet 5-325 approximately 30 one tablet q.4 hours p.r.n. pain as needed. The patient was advised of strict postoperative instructions and will follow in the office in 3 days.       EARNEST SHAH MD [id number]  D:  05/23/2019 17:35  T:  05/24/2019 0:36  JOB #:  [id number]

## 2020-02-05 ENCOUNTER — HOSPITAL ENCOUNTER (OUTPATIENT)
Dept: HOSPITAL 53 - M SFHCLERA | Age: 35
End: 2020-02-05
Attending: NURSE PRACTITIONER
Payer: COMMERCIAL
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
## 2020-02-05 DIAGNOSIS — T81.89XA: Primary | ICD-10-CM

## 2020-02-05 PROCEDURE — 87077 CULTURE AEROBIC IDENTIFY: CPT

## 2020-02-05 PROCEDURE — 87070 CULTURE OTHR SPECIMN AEROBIC: CPT

## 2020-02-05 PROCEDURE — 87186 SC STD MICRODIL/AGAR DIL: CPT

## 2020-03-26 ENCOUNTER — HOSPITAL ENCOUNTER (OUTPATIENT)
Dept: HOSPITAL 53 - M RAD | Age: 35
End: 2020-03-26
Payer: COMMERCIAL

## 2020-03-26 DIAGNOSIS — R10.9: Primary | ICD-10-CM

## 2020-03-26 NOTE — REP
REASON FOR EXAM:  Status post abdominoplasty.

 

There are no priors for comparison.

 

The only prior from [facility] is a lumbar spine MRI.

 

The lack of intravenous contrast significantly decreases the sensitivity of the

exam.

 

The lung bases are clear.

 

There is evidence of postoperative change involving the anterior midline

abdominal wall.  There is no evidence of an abnormal air fluid collection or

significant fluid collection, which is well demarcated.

 

Limited evaluation of the solid intra-abdominal organ show no gross

abnormalities. The patient is status post cholecystectomy.  Limited evaluation of

the pancreas, adrenal glands, and kidneys show no gross abnormalities.  There is

no evidence of free fluid or free air.  Limited evaluation of the bowel loops and

their mesenteries show no gross abnormalities.

 

Bone window technique throughout the exam shows the osseous structures to be

within normal limits.

 

IMPRESSION:

 

Postoperative changes suspected along the anterior abdominal wall as described

above.  There is no evidence of a significant seroma or air fluid level that

would be suspicious for an abscess at this time, however, the examination is

limited without the administration of intravenous contrast.  Consider followup if

necessary.

 

 

Electronically Signed by

Johan Landrum DO 03/26/2020 02:41 P

## 2021-06-08 NOTE — DISCHARGE INSTRUCTIONS
[address] [place] [address], [address]        Phone: [phone]   Fax: [phone]    [place] Josh NAVAS D.P.M. POST OPERATIVE INSTRUCIONS FOR FOOT SURGERY    I)   DO NOT DRIVE TODAY AND FOR AS LONG AS YOU HAVE A SURGICAL SHOE OR CAST ON.  ALSO, DO NOT DRIVE IF YOUR FOOT FEELS SO UNCOMFORTABLE THAT YOU COULD HAVE DIFFICULTY OPERATING THE PEDALS. IF YOU DID NOT NEED A CAST, SURGICAL SHOE, BRACE OR SPLINT, THEN DO NOT DRIVE UNTIL ALLOF THE LOCAL ANESTHETIC HAS WORN OFF. THIS IS USUALLY 18 HOURS. 2)   KEEP YOUR OPERATIVE FOOT CLEAN AND DRY. YOU SHOULD COVER IT WITH A PLASTIC BAG WHEN OUTDOORS IN WET OR RAINY CONDITIONS. DO NOT TAKE A SHOWER WITH A CAST, DRESSING,  ETC. ON- A PLASTIC BAG IS NOT EFFECTIVE IN KEEPING A FOOT DRY IN A SHOWER- IT WILL LEAK. 3)   YOU MAY TAKE A TUB BATH AND HANG THE FOOT OUT OF THE TUB OR TAKE A SPONGE BATH. 4)   APPLY ICE AND ELEVATE THE FOOT ABOVE THE LEVEL OF THE HEART AFTER SURGERY. THIS WILL REDUCE PAIN, SWELLING, AND HEAT. FOR MINOR NATL OR SOFT TISSUE SURGERY, YOU MAY NEED TO DO THIS FOR ONLY ONE OR TWO DAYS. FOR BONE OR OTHER MAJOR SURGERIES,  THIS MAY REQUIRE FOUR OR FIVE DAYS. DO NOT DISTRUB THE DRESSING UNLESS THE DOCTOR HAS INSTRUCTED YOU OTHERWISE. 5)   GO STRAIGHT HOME AFTER OUT PATTENT SURGERY. HAVE SOMEONE ELSE DRIVE. 6)   TAKE YOUR MEDICATIONS AS DIRECTED. TAKE PAIN MEDICATIONS ONLY AS YOU NEED THEM. IF YOU TAKE THE PAIN MEDIATJON, THEN DO NOT DRINK ALCOHOL, DRIVE OR WORK IN SITUATIONS WHERE SLOW  REFLEXES COULD PUT YOU OR OTHERS IN DANGER. YOU MAY TAKE  ASPIRIN OR ACETAMINOPHEN (TYLENOL) INSTEAD OF THE PRESCRIBED MEDICATIONS IF THE DISCOMFORT IS ONLY MILD. 7)   DO NOT STICK ANYTHING DOWN THE CAST- KEEP IT DRY- DO NOT PICK AT THE COTTON PADDING. YOU ARE TO WEAR A SOCK OVER THE END OF YOUR CAST OR DRESSING.   PAGE 2- POST OPERATIVE INSTRUCTIONS    8) IF YOU HAVE A WALKING CAST THAT HAS BEEN JUST APPLIED, YOU MUST NOT PUT ANY PRESSURE ON IT FOR 36 TO 48 HOURS. IT MAY FEEL DRY AND SEEM STRONG BUT IT HAS NOT YET CURED-USE CRUTCHES  UNTIL THEN. IF IT IS A NON-WALKING CAST, PUT NO WEIGHT ON IT AT ALL. 9)   KEEP A WATCI-IFUL EYE ON THE COLOR OF YOUR TOES IF THEY ARE EXPOSED. IF THEY TURN WHITE, PURPLE, OR  DUSKY AND THIS PERSISTS LONGER THAN AN HOUR OR SO, THEN YOU SHOULD SEE THE DOCTOR IMMEDIATELY. A SLIGHT AMOUNT OF BLOOD ON THE CAST OR DRESSING IS NORMAL. IF THE DRESSING IS SATURATED WITH BLOOD, THEN YOU SHOULD CALL THE DOCTOR. 10) A SUDDEN INCREASE IN PAIN, SWELLING OF THE WHOLE FOOT,  _               EXCESSIVE HEAT, REDSTREAKS UP THE FOOFOR LEG, CALF PAIN, OR TENDER LUMPS BEHIND THE KNEE OR IN THE GROIN, SEVERE BLEEDING OR FEELING ILL MAY BE SERIOUS SIGNS AND YOU SHOULD CONTACT YOUR DOCTOR IMMEDIATELY. I 1) IF THE CAST SEEMS EXCESSIVELY TIGHT OR PAINFUL AND DOES NOT IMPROVE WITH ELEVATION,  THEN CALL THE DOCTOR. 12) RETURN FOR SURGICAL FOLLOW UP AS DIRECTED. YOUR FIRST VISIT AFTER SURGERY IS USUALLY WITHIN FOUR TO FIVE DAYS. FOLLOWING THESE INSTRUCTIONS WILL ALLOW YOU BETTER SURGICAL RESULTS, REDUCE PAIN, LIMIT COMPLICATIONS, AND ALLOW YOUR WOUND TO HEAL RAPIDLY. Tylenol given at 1:45 pm, next dose of Tylenol or Percocet may be taken at 7:45 pm.               IN CASE OF AN EMERGENCY CONTACT THE DOCTOR ON THE BEEPER:  [address] from Nurse    PATIENT INSTRUCTIONS:    After general anesthesia or intravenous sedation, for 24 hours or while taking prescription Narcotics:  · Limit your activities  · Do not drive and operate hazardous machinery  · Do not make important personal or business decisions  · Do  not drink alcoholic beverages  · If you have not urinated within 8 hours after discharge, please contact your surgeon on call.     Report the following to your surgeon:  · Excessive pain, swelling, redness or odor of or around the surgical area  · Temperature over 100.5  · Nausea and vomiting lasting longer than 4 hours or if unable to take medications  · Any signs of decreased circulation or nerve impairment to extremity: change in color, persistent  numbness, tingling, coldness or increase pain  · Any questions    What to do at Home:  Recommended activity: See surgical instructions. If you experience any of the following symptoms as noted above, please follow up with Dr. Rigo Orona. *  Please give a list of your current medications to your Primary Care Provider. *  Please update this list whenever your medications are discontinued, doses are      changed, or new medications (including over-the-counter products) are added. *  Please carry medication information at all times in case of emergency situations. These are general instructions for a healthy lifestyle:    No smoking/ No tobacco products/ Avoid exposure to second hand smoke  Surgeon General's Warning:  Quitting smoking now greatly reduces serious risk to your health. Obesity, smoking, and sedentary lifestyle greatly increases your risk for illness    A healthy diet, regular physical exercise & weight monitoring are important for maintaining a healthy lifestyle    You may be retaining fluid if you have a history of heart failure or if you experience any of the following symptoms:  Weight gain of 3 pounds or more overnight or 5 pounds in a week, increased swelling in our hands or feet or shortness of breath while lying flat in bed. Please call your doctor as soon as you notice any of these symptoms; do not wait until your next office visit. Recognize signs and symptoms of STROKE:    F-face looks uneven    A-arms unable to move or move unevenly    S-speech slurred or non-existent    T-time-call 911 as soon as signs and symptoms begin-DO NOT go       Back to bed or wait to see if you get better-TIME IS BRAIN.     Warning Signs of HEART ATTACK Call 911 if you have these symptoms:   Chest discomfort. Most heart attacks involve discomfort in the center of the chest that lasts more than a few minutes, or that goes away and comes back. It can feel like uncomfortable pressure, squeezing, fullness, or pain.  Discomfort in other areas of the upper body. Symptoms can include pain or discomfort in one or both arms, the back, neck, jaw, or stomach.  Shortness of breath with or without chest discomfort.  Other signs may include breaking out in a cold sweat, nausea, or lightheadedness. Don't wait more than five minutes to call 911 - MINUTES MATTER! Fast action can save your life. Calling 911 is almost always the fastest way to get lifesaving treatment. Emergency Medical Services staff can begin treatment when they arrive -- up to an hour sooner than if someone gets to the hospital by car. The discharge information has been reviewed with the caregiver. The caregiver verbalized understanding. Discharge medications reviewed with the caregiver and appropriate educational materials and side effects teaching were provided.   ___________________________________________________________________________________________________________________________________ mid upper back

## 2022-01-14 ENCOUNTER — HOSPITAL ENCOUNTER (EMERGENCY)
Age: 37
Discharge: HOME | End: 2022-01-14
Attending: FAMILY MEDICINE
Payer: MEDICAID

## 2022-01-14 VITALS
HEART RATE: 103 BPM | DIASTOLIC BLOOD PRESSURE: 65 MMHG | TEMPERATURE: 100 F | SYSTOLIC BLOOD PRESSURE: 100 MMHG | RESPIRATION RATE: 18 BRPM | WEIGHT: 198 LBS | BODY MASS INDEX: 33.8 KG/M2 | HEIGHT: 64 IN | OXYGEN SATURATION: 96 %

## 2022-01-14 DIAGNOSIS — M79.10 MYALGIA: Primary | ICD-10-CM

## 2022-01-14 PROCEDURE — 99284 EMERGENCY DEPT VISIT MOD MDM: CPT

## 2022-01-14 PROCEDURE — 74011250637 HC RX REV CODE- 250/637: Performed by: FAMILY MEDICINE

## 2022-01-14 RX ORDER — ACETAMINOPHEN 500 MG
1000 TABLET ORAL ONCE
Status: COMPLETED | OUTPATIENT
Start: 2022-01-14 | End: 2022-01-14

## 2022-01-14 RX ORDER — ONDANSETRON 4 MG/1
4 TABLET, ORALLY DISINTEGRATING ORAL
Status: COMPLETED | OUTPATIENT
Start: 2022-01-14 | End: 2022-01-14

## 2022-01-14 RX ADMIN — ONDANSETRON 4 MG: 4 TABLET, ORALLY DISINTEGRATING ORAL at 22:29

## 2022-01-14 RX ADMIN — ACETAMINOPHEN 1000 MG: 500 TABLET ORAL at 22:26

## 2022-01-14 NOTE — Clinical Note
[facility] EMERGENCY DEPARTMENT  [address] [address]  [phone]    Work/School Note    Date: 1/14/2022     To Whom It May concern:    Xavier Pineda was evaluated by the following provider(s):  Attending Provider: Pablo Dux virus is suspected. Per the CDC guidelines we recommend home isolation until the following conditions are all met:    1. At least five days have passed since symptoms first appeared and/or had a close exposure,   2. After home isolation for five days, wearing a mask around others for the next five days,  3. At least 24 have passed since last fever without the use of fever-reducing medications and  4.  Symptoms (eg cough, shortness of breath) have improved      Sincerely,          Isela Baeza, DO

## 2022-01-15 NOTE — ED PROVIDER NOTES
EMERGENCY DEPARTMENT HISTORY AND PHYSICAL EXAM      Date: 1/14/2022  Patient Name: Arcelia Stern    History of Presenting Illness     Chief Complaint   Patient presents with    Generalized Body Aches       History Provided By: Patient    HPI: Arcelia Stern, 39 y.o. female presents to the ED with CC of body aches and fatigue. Symptoms started several days ago. Worse this afternoon. Also endorses occasional chills. No chest pain or shortness of breath. No fevers. Occasional nausea, no vomiting. No abdominal pain. Concern for COVID-19. Patient denies SOB, chest pain, or any neurological symptoms. There are no other complaints, changes, or physical findings at this time. Past History     Past Medical History:  Past Medical History:   Diagnosis Date    Aortic septal defect     Cervical cancer ([facility] [address]     Endometritis 2005    GERD (gastroesophageal reflux disease)        Allergies: Allergies   Allergen Reactions    Amoxicillin Hives    Penicillins Hives    Prednisone Hives    Trazodone Hives       Review of Systems   Vital signs and nursing notes reviewed  Review of Systems   Constitutional: Positive for fatigue. Negative for activity change, appetite change, chills and fever. HENT: Negative for congestion and sore throat. Eyes: Negative for photophobia and visual disturbance. Respiratory: Negative for cough, shortness of breath and wheezing. Cardiovascular: Negative for chest pain, palpitations and leg swelling. Gastrointestinal: Negative for abdominal pain, diarrhea, nausea and vomiting. Endocrine: Negative for cold intolerance and heat intolerance. Musculoskeletal: Positive for myalgias. Negative for gait problem and joint swelling. Skin: Negative for color change and rash. Neurological: Negative for dizziness and headaches.          Physical Exam     Visit Vitals  /65 (BP 1 Location: Left upper arm, BP Patient Position: At rest)   Pulse (!) 103   Temp 100 °F (37.8 °C)   Resp 18   Ht 5' 4\" (1.626 m)   Wt 89.8 kg (198 lb)   SpO2 96%   BMI 33.99 kg/m²     CONSTITUTIONAL: Alert, in no distress. Appears stated age. HEAD:  Normocephalic, atraumatic  EYES: EOM intact. No conjunctival injection or scleral icterus  Neck:  Supple. No meningismus  RESP: Normal with no work of breathing, speaking in full sentences. Lungs clear bilaterally  CV: Well perfused. NEURO: Alert with normal mentation, moving extremities spontaneously  PSYCH: Normal mood, normal affect      Medical Decision Making   Patient presents for COVID 19 testing with normal oxygen saturation and mild URI symptoms or COVID 19 exposure. COVID 19 testing was not conducted. The patient was given quarantine/isolation recommendations and agrees with the plan to be discharged home. They were provided instructions to return for difficulty breathing, chest pain, altered mentation, or any other new or worsening symptoms. ED Course:   Initial assessment performed. The patients presenting problems have been discussed, and they are in agreement with the care plan formulated and outlined with them. I have encouraged them to ask questions as they arise throughout their visit. Critical Care Time: None    Disposition:  DISCHARGE NOTE:  The pt is ready for discharge. The pt's signs, symptoms, diagnosis, and discharge instructions have been discussed and pt has conveyed their understanding. The pt is to follow up as recommended or return to ER should their symptoms worsen. Plan has been discussed and pt is in agreement. PLAN:  1. Discharge Medication List as of 1/14/2022 10:31 PM        2. Follow-up Information     Follow up With Specialties Details Why Contact Info    Your primary care doctor  Schedule an appointment as soon as possible for a visit in 1 day            4. Take Tylenol or Ibuprofen as needed  5. Drink plenty of fluids  6.  Return to ED if worse especially if any shortness of breath, chest pain or altered mentation. Diagnosis     Clinical Impression:   1. Myalgia            Please note that this dictation was completed with [employer], the computer voice recognition software. Quite often unanticipated grammatical, syntax, homophones, and other interpretive errors are inadvertently transcribed by the computer software. Please disregards these errors. Please excuse any errors that have escaped final proofreading.

## 2022-01-15 NOTE — ED TRIAGE NOTES
Pt complaining of generalized body aches. States received 1st dose of pfizer vaccine on Tuesday and is feeling awful that started today.

## 2022-03-18 PROBLEM — M77.51 BONE SPUR OF TOE OF RIGHT FOOT: Status: ACTIVE | Noted: 2019-05-23

## 2022-03-19 PROBLEM — M77.52 BONE SPUR OF TOE OF LEFT FOOT: Status: ACTIVE | Noted: 2019-05-23

## 2022-03-21 ENCOUNTER — TELEPHONE (OUTPATIENT)
Dept: OBGYN CLINIC | Age: 37
End: 2022-03-21

## 2022-03-21 NOTE — TELEPHONE ENCOUNTER
Call received at 825am    39year old patient seen in previous practice. Patient has upcoming appointment on 3/28/2022 at 11:00am and was advised to come at 10:45am        Patient has been with out her Prometrium 1.25mg? ?  For two months    Pharmacy confirmed     [address] [facility] [address]      Please advise    Thank you

## 2022-03-21 NOTE — TELEPHONE ENCOUNTER
Juanita Curran MD  You 13 minutes ago (11:01 AM)     Radha Estrada on Premarin 1.25 which you can refill      rx pended for amend/sign     Please confirm frequency    Thank you

## 2022-03-21 NOTE — TELEPHONE ENCOUNTER
Prescription sent by MD    This nurse attempted to reach the patient and left a detailed message that her prescription was sent to her pharmacy

## 2022-03-28 ENCOUNTER — OFFICE VISIT (OUTPATIENT)
Dept: OBGYN CLINIC | Age: 37
End: 2022-03-28
Payer: MEDICAID

## 2022-03-28 VITALS — WEIGHT: 198 LBS | DIASTOLIC BLOOD PRESSURE: 80 MMHG | SYSTOLIC BLOOD PRESSURE: 140 MMHG | BODY MASS INDEX: 33.99 KG/M2

## 2022-03-28 DIAGNOSIS — N95.1 MENOPAUSAL SYNDROME: ICD-10-CM

## 2022-03-28 DIAGNOSIS — Z01.419 ROUTINE GYNECOLOGICAL EXAMINATION: Primary | ICD-10-CM

## 2022-03-28 PROCEDURE — 99385 PREV VISIT NEW AGE 18-39: CPT | Performed by: OBSTETRICS & GYNECOLOGY

## 2022-03-28 RX ORDER — ALPRAZOLAM 1 MG/1
1 TABLET ORAL
COMMUNITY
Start: 2022-03-11

## 2022-03-28 RX ORDER — OXYCODONE HYDROCHLORIDE 5 MG/1
5 TABLET ORAL
COMMUNITY
Start: 2022-02-03 | End: 2022-03-28 | Stop reason: ALTCHOICE

## 2022-03-28 RX ORDER — ESTRADIOL 0.1 MG/G
CREAM VAGINAL
COMMUNITY
Start: 2022-02-04 | End: 2022-03-28 | Stop reason: ALTCHOICE

## 2022-03-28 NOTE — PROGRESS NOTES
Annual exam ages 21-44 post hysterectomy    Papi Lucas is a ,  39 y.o. female   Patient's last menstrual period was 10/11/2010. She presents for her annual checkup. She is having no significant problems. With regard to the Gardasil vaccine, she has not received it yet. Hormonal status:  She reports no perimenstrual type symptoms. She is having bad vasomotor symptoms. The patient is using any ERT. premarin. Sexual history:    She  reports being sexually active and has had partner(s) who are male. She reports using the following method of birth control/protection: Surgical.    Medical conditions:    Since her last annual GYN exam about one year ago, she has not the following changes in her health history: none. Surgical history confirmed with patient. has a past surgical history that includes hx laparotomy (); hx liposuction (); hx cholecystectomy (10/22/10); hx pelvic laparoscopy (); hx dilation and curettage (); hx dilation and curettage (2008); hx hysterectomy (); hx other surgical; hx other surgical; hx orthopaedic (Right, 2017); hx total laparoscopic hysterectomy w/ bs&o (2018); hx pelvic laparoscopy (2009); and hx pelvic laparoscopy (2018). Pap and Mammogram History:    Her most recent Pap smear was normal, obtained 1 year(s) ago. The patient had a recent mammogram 1 year which was negative for malignancy.     Breast Cancer History/Substance Abuse: negative    Patient Active Problem List   Diagnosis Code    Calculus of gallbladder with other cholecystitis, without mention of obstruction K80.10    Bone spur of toe of left foot M77.52    Bone spur of toe of right foot M77.51    Hormone replacement therapy (HRT) Z79.890     Past Medical History:   Diagnosis Date    Aortic septal defect     [facility]ervical [facility] ([facility] [address]     Cervical high risk HPV (human papillomavirus) test positive     Chlamydia 2014    Chronic pelvic pain in female     Dysmenorrhea 2017    Dyspareunia in female 2017    Dysplasia of cervix, low grade ([facility]) 2017    Endometriosis 2005    Lupron    GERD (gastroesophageal reflux disease)     History of recurrent , antepartum     Hormone replacement therapy (HRT)     PCOS (polycystic ovarian syndrome)     Pelvic adhesions     Wound disruption, post-op, skin 2018    Vag Cuff post intercourse 6 wk     Past Surgical History:   Procedure Laterality Date    HX CHOLECYSTECTOMY  10/22/10    HX DILATION AND CURETTAGE      HX DILATION AND CURETTAGE  2008    HX HYSTERECTOMY  2018    HX LAPAROTOMY  2009    HX LIPOSUCTION      abdomen, arms, hips, sides    HX ORTHOPAEDIC Right 2017    carpal tunnel     HX OTHER SURGICAL      oral surgery    HX OTHER SURGICAL      Ingrown Toenail    HX PELVIC LAPAROSCOPY      Endometriosis    HX PELVIC LAPAROSCOPY  2009    LISA   18 LISA    HX PELVIC LAPAROSCOPY  2018    [facility] LSO    HX TOTAL LAPAROSCOPIC HYSTERECTOMY W/ BS&O  2018    [facility] RSO  (LSO done 18 Davinci)     OB History    Para Term  AB Living   6 2 1 1 4 2   SAB IAB Ectopic Molar Multiple Live Births   4 0 0 0 0 2      # Outcome Date GA Lbr Jens/2nd Weight Sex Delivery Anes PTL Lv   6  13 36w0d  6 lb 3 oz (2.807 kg) M Vag-Spont EPI Y CHLOE      Birth Comments: Hospitalized at 29w 6 cm dilated   5 Term 06/22/10 39w0d  6 lb 3 oz (2.807 kg) F Vag-Spont EPI  CHLOE   4 SAB            3 SAB            2 SAB            1 SAB              Gyn Flowsheet:  GYN HISTORY 3/28/2022   Mammogram Date 2021   Mammogram Results WNL   Pap Date 2021   Pap Results WNL Neg HPV   Some recent data might be hidden     Social History     Socioeconomic History    Marital status:    Tobacco Use    Smoking status: Current Every Day Smoker     Packs/day: 0.50     Years: 13.00     Pack years: 6.50     Types: Cigarettes    Smokeless tobacco: Never Used   Vaping Use    Vaping Use: Never used   Substance and Sexual Activity    Alcohol use: Yes    Drug use: Never    Sexual activity: Yes     Partners: Male     Birth control/protection: Surgical      Family History   Problem Relation Age of Onset    Diabetes Maternal Grandmother     Hypertension Maternal Grandmother     Breast Cancer Maternal Aunt 48        stage 4     Current Outpatient Medications on File Prior to Visit   Medication Sig Dispense Refill    ALPRAZolam (XANAX) 1 mg tablet Take 1 mg by mouth nightly.  fluvoxaMINE (LUVOX) 100 mg tablet Take 100 mg by mouth every evening.  ibuprofen (MOTRIN) 800 mg tablet Take  by mouth.  [DISCONTINUED] oxyCODONE IR (ROXICODONE) 5 mg immediate release tablet Take 5 mg by mouth every six (6) hours as needed.  [DISCONTINUED] estradioL (ESTRACE) 0.01 % (0.1 mg/gram) vaginal cream       [DISCONTINUED] conjugated estrogens (PREMARIN) 1.25 mg tablet Take 1 Tablet by mouth daily. 30 Tablet 0    [DISCONTINUED] dicyclomine (BENTYL) 10 mg capsule Take 10 mg by mouth 4 times daily (before meals and nightly).  lamoTRIgine (LAMICTAL) 25 mg tablet Take 50 mg by mouth daily.  [DISCONTINUED] QUEtiapine (SEROQUEL) 25 mg tablet Take 753 mg by mouth nightly.  [DISCONTINUED] estradiol (CLIMARA) 0.1 mg/24 hr 1 Patch by TransDERmal route two (2) times a week.  [DISCONTINUED] venlafaxine (EFFEXOR) 37.5 mg tablet Take 37.5 mg by mouth three (3) times daily. No current facility-administered medications on file prior to visit.      Allergies   Allergen Reactions    Amoxicillin Hives    Penicillins Hives and Swelling    Trazodone Hives and Diarrhea    Prednisone Hives and Rash       Review of Systems - History obtained from the patient-negative for:  Constitutional: weight loss, fever, night sweats  HEENT: hearing loss, earache, congestion, snoring, sorethroat  CV: chest pain, palpitations, edema  Resp: cough, shortness of breath, wheezing  Breast: breast lumps, nipple discharge, galactorrhea  GI: change in bowel habits, abdominal pain, black or bloody stools  : frequency, dysuria, hematuria, vaginal discharge  MSK: back pain, joint pain, muscle pain  Skin: itching, rash, hives  Neuro: dizziness, headache, confusion, weakness  Psych: anxiety, depression, change in mood  Heme/lymph: bleeding, bruising, pallor    Physical Exam    Visit Vitals  BP (!) 140/80   Wt 198 lb (89.8 kg)   LMP 10/11/2010   BMI 33.99 kg/m²       Constitutional  · Appearance: well-nourished, well developed, alert, in no acute distress    HENT  · Head and Face: appears normal    Neck  · Inspection/Palpation: normal appearance, no masses or tenderness  · Lymph Nodes: no lymphadenopathy present  · Thyroid: gland size normal, nontender, no nodules or masses present on palpation    Chest  · Respiratory Effort: breathing unlabored  · Auscultation: normal breath sounds    Cardiovascular  · Heart:  · Auscultation: regular rate and rhythm without murmur    Breasts  · Inspection of Breasts: breasts symmetrical, no skin changes, no discharge present, nipple appearance normal, no skin retraction present  · Palpation of Breasts and Axillae: no masses present on palpation, no breast tenderness  · Axillary Lymph Nodes: no lymphadenopathy present    Gastrointestinal  · Abdominal Examination: abdomen non-tender to palpation, normal bowel sounds, no masses present  · Liver and spleen: no hepatomegaly present, spleen not palpable  · Hernias: no hernias identified    Genitourinary  · External Genitalia: normal appearance for age, no discharge present, no tenderness present, no inflammatory lesions present, no masses present, no atrophy present  · Vagina: normal vaginal vault without central or paravaginal defects, no discharge present, no inflammatory lesions present, no masses present  · Bladder: non-tender to palpation  · Urethra: appears normal  · Cervix: absent   · Uterus: absent  · Adnexa: no adnexal tenderness present, no adnexal masses present  · Perineum: perineum within normal limits, no evidence of trauma, no rashes or skin lesions present  · Anus: anus within normal limits, no hemorrhoids present  · Inguinal Lymph Nodes: no lymphadenopathy present    Skin  · General Inspection: no rash, no lesions identified    Neurologic/Psychiatric  · Mental Status:  · Orientation: grossly oriented to person, place and time  · Mood and Affect: mood normal, affect appropriate    . Assessment:  Routine gynecologic examination  Her current medical status is satisfactory with no evidence of significant gynecologic issues.   Menopausal symptoms    Plan:  Counseled re: diet, exercise, healthy lifestyle  Return for yearly wellness visits  Gardisil counseling provided  Pt counseled regarding co-testing for high risk HPV with pap  Rec repeat mammo  Will increase primarin to BID

## 2022-04-02 LAB
CYTOLOGIST CVX/VAG CYTO: ABNORMAL
CYTOLOGY CVX/VAG DOC CYTO: ABNORMAL
CYTOLOGY CVX/VAG DOC THIN PREP: ABNORMAL
CYTOLOGY HISTORY:: ABNORMAL
DX ICD CODE: ABNORMAL
HPV GENOTYPE 18,45: NEGATIVE
HPV I/H RISK 4 DNA CVX QL PROBE+SIG AMP: POSITIVE
HPV16 DNA CVX QL PROBE+SIG AMP: NEGATIVE
Lab: ABNORMAL
OTHER STN SPEC: ABNORMAL
STAT OF ADQ CVX/VAG CYTO-IMP: ABNORMAL

## 2022-09-18 ENCOUNTER — APPOINTMENT (OUTPATIENT)
Dept: CT IMAGING | Age: 37
End: 2022-09-18
Attending: EMERGENCY MEDICINE
Payer: MEDICAID

## 2022-09-18 ENCOUNTER — HOSPITAL ENCOUNTER (EMERGENCY)
Age: 37
Discharge: HOME | End: 2022-09-19
Attending: EMERGENCY MEDICINE
Payer: MEDICAID

## 2022-09-18 DIAGNOSIS — R10.84 ABDOMINAL PAIN, GENERALIZED: Primary | ICD-10-CM

## 2022-09-18 LAB
ALBUMIN SERPL-MCNC: 3.1 G/DL (ref 3.5–5)
ALBUMIN/GLOB SERPL: 1 {RATIO} (ref 1.1–2.2)
ALP SERPL-CCNC: 70 U/L (ref 45–117)
ALT SERPL-CCNC: 31 U/L (ref 12–78)
ANION GAP SERPL CALC-SCNC: 4 MMOL/L (ref 5–15)
APPEARANCE UR: CLEAR
AST SERPL W P-5'-P-CCNC: 16 U/L (ref 15–37)
BACTERIA URNS QL MICRO: NEGATIVE /HPF
BILIRUB SERPL-MCNC: 0.2 MG/DL (ref 0.2–1)
BILIRUB UR QL: NEGATIVE
BUN SERPL-MCNC: 17 MG/DL (ref 6–20)
BUN/CREAT SERPL: 16 (ref 12–20)
CA-I BLD-MCNC: 9.1 MG/DL (ref 8.5–10.1)
CHLORIDE SERPL-SCNC: 110 MMOL/L (ref 97–108)
CO2 SERPL-SCNC: 28 MMOL/L (ref 21–32)
COLOR UR: ABNORMAL
CREAT SERPL-MCNC: 1.04 MG/DL (ref 0.55–1.02)
GLOBULIN SER CALC-MCNC: 3.1 G/DL (ref 2–4)
GLUCOSE SERPL-MCNC: 83 MG/DL (ref 65–100)
GLUCOSE UR STRIP.AUTO-MCNC: NEGATIVE MG/DL
HGB UR QL STRIP: NEGATIVE
KETONES UR QL STRIP.AUTO: NEGATIVE MG/DL
LEUKOCYTE ESTERASE UR QL STRIP.AUTO: NEGATIVE
MUCOUS THREADS URNS QL MICRO: ABNORMAL /LPF
NITRITE UR QL STRIP.AUTO: NEGATIVE
PH UR STRIP: 5 [PH]
POTASSIUM SERPL-SCNC: 3.7 MMOL/L (ref 3.5–5.1)
PROT SERPL-MCNC: 6.2 G/DL (ref 6.4–8.2)
PROT UR STRIP-MCNC: NEGATIVE MG/DL
RBC #/AREA URNS HPF: ABNORMAL /HPF (ref 0–5)
SODIUM SERPL-SCNC: 142 MMOL/L (ref 136–145)
SP GR UR REFRACTOMETRY: 1.02 (ref 1–1.03)
UROBILINOGEN UR QL STRIP.AUTO: 0.1 EU/DL (ref 0.2–1)
WBC URNS QL MICRO: ABNORMAL /HPF (ref 0–4)

## 2022-09-18 PROCEDURE — 74176 CT ABD & PELVIS W/O CONTRAST: CPT

## 2022-09-18 PROCEDURE — 81003 URINALYSIS AUTO W/O SCOPE: CPT

## 2022-09-18 PROCEDURE — 80053 COMPREHEN METABOLIC PANEL: CPT

## 2022-09-18 PROCEDURE — 99284 EMERGENCY DEPT VISIT MOD MDM: CPT

## 2022-09-19 VITALS
RESPIRATION RATE: 18 BRPM | OXYGEN SATURATION: 99 % | TEMPERATURE: 98.2 F | HEIGHT: 64 IN | HEART RATE: 74 BPM | BODY MASS INDEX: 33.84 KG/M2 | SYSTOLIC BLOOD PRESSURE: 125 MMHG | DIASTOLIC BLOOD PRESSURE: 69 MMHG | WEIGHT: 198.2 LBS

## 2022-09-19 PROCEDURE — 74011250637 HC RX REV CODE- 250/637: Performed by: EMERGENCY MEDICINE

## 2022-09-19 RX ORDER — TRAMADOL HYDROCHLORIDE 50 MG/1
50 TABLET ORAL
Status: COMPLETED | OUTPATIENT
Start: 2022-09-19 | End: 2022-09-19

## 2022-09-19 RX ORDER — NAPROXEN 500 MG/1
500 TABLET ORAL
Qty: 20 TABLET | Refills: 0 | Status: SHIPPED | OUTPATIENT
Start: 2022-09-19

## 2022-09-19 RX ADMIN — TRAMADOL HYDROCHLORIDE 50 MG: 50 TABLET ORAL at 00:38

## 2022-09-19 NOTE — ED PROVIDER NOTES
EMERGENCY DEPARTMENT HISTORY AND PHYSICAL EXAM      Date: 2022  Patient Name: Jose Marmolejo    History of Presenting Illness     Chief Complaint   Patient presents with    Abdominal Pain       History Provided By: Patient    HPI: Jose Marmolejo, 40 y.o. female with known past medical history significant for \"tummy tuck\" presents with mild diffuse abdominal pain without exacerbating relieving factors. It does not radiate she describes it as crampy feeling. She says she occasionally has fluid leaking from her abdomen. She denies any fever, chills, nausea, vomiting, chest pain, shortness of breath, rash, diarrhea, headache, night sweats, weight loss. Symptoms are mild to moderate and progressively getting worse. There are no other complaints, changes, or physical findings at this time. PCP: Baldomero Henry MD    No current facility-administered medications on file prior to encounter. Current Outpatient Medications on File Prior to Encounter   Medication Sig Dispense Refill    ALPRAZolam (XANAX) 1 mg tablet Take 1 mg by mouth nightly. conjugated estrogens (PREMARIN) 1.25 mg tablet Take 1 Tablet by mouth two (2) times a day for 360 days. 60 Tablet 11    lamoTRIgine (LAMICTAL) 25 mg tablet Take 50 mg by mouth daily. fluvoxaMINE (LUVOX) 100 mg tablet Take 100 mg by mouth every evening. [DISCONTINUED] ibuprofen (MOTRIN) 800 mg tablet Take  by mouth.          Past History     Past Medical History:  Past Medical History:   Diagnosis Date    Aortic septal defect     Cervical cancer ([facility] [address]     Cervical high risk HPV (human papillomavirus) test positive     Chlamydia     Chronic pelvic pain in female     Dysmenorrhea 2017    Dyspareunia in female 2017    Dysplasia of cervix, low grade (KEDAR 1) 2017    Endometriosis 2005    Lupron    GERD (gastroesophageal reflux disease)     History of recurrent , antepartum     Hormone replacement therapy (HRT)     PCOS (polycystic ovarian syndrome)     Pelvic adhesions     Wound disruption, post-op, skin 02/21/2018    Vag Cuff post intercourse 6 wk       Past Surgical History:  Past Surgical History:   Procedure Laterality Date    HX ABDOMINOPLASTY  2019    HX CHOLECYSTECTOMY  10/22/10    HX DILATION AND CURETTAGE  2005    HX DILATION AND CURETTAGE  12/02/2008    HX HYSTERECTOMY  2018    HX LAPAROTOMY  2009    HX LIPOSUCTION  2018    abdomen, arms, hips, sides    HX ORTHOPAEDIC Right 2017    carpal tunnel     HX OTHER SURGICAL      oral surgery    HX OTHER SURGICAL      Ingrown Toenail    HX PELVIC LAPAROSCOPY  2005    Endometriosis    HX PELVIC LAPAROSCOPY  03/17/2009    LISA   4/23/18 LISA    HX PELVIC LAPAROSCOPY  04/23/2018    Davinci LSO    HX TOTAL LAPAROSCOPIC HYSTERECTOMY W/ BS&O  01/02/2018    Davinci RSO  (LSO done 4/23/18 Francis Patiño)       Family History:  Family History   Problem Relation Age of Onset    Diabetes Maternal Grandmother     Hypertension Maternal Grandmother     Breast Cancer Maternal Aunt 48        stage 4       Social History:  Social History     Tobacco Use    Smoking status: Every Day     Packs/day: 0.50     Years: 13.00     Pack years: 6.50     Types: Cigarettes    Smokeless tobacco: Never   Vaping Use    Vaping Use: Never used   Substance Use Topics    Alcohol use: Yes    Drug use: Never       Allergies: Allergies   Allergen Reactions    Amoxicillin Hives    Penicillins Hives and Swelling    Trazodone Hives and Diarrhea    Prednisone Hives and Rash       Review of Systems   Review of Systems   Constitutional: Negative. Negative for appetite change, chills, fatigue and fever. HENT: Negative. Negative for congestion and sinus pain. Eyes: Negative. Negative for pain and visual disturbance. Respiratory: Negative. Negative for chest tightness and shortness of breath. Cardiovascular: Negative. Negative for chest pain. Gastrointestinal:  Positive for abdominal pain.  Negative for diarrhea, nausea and vomiting. Genitourinary: Negative. Negative for difficulty urinating. No discharge   Musculoskeletal: Negative. Negative for arthralgias. Skin: Negative. Negative for rash. Neurological: Negative. Negative for weakness and headaches. Hematological: Negative. Psychiatric/Behavioral: Negative. Negative for agitation. The patient is not nervous/anxious. All other systems reviewed and are negative. Physical Exam   Physical Exam  Vitals and nursing note reviewed. Constitutional:       General: She is not in acute distress. Appearance: She is well-developed. HENT:      Head: Normocephalic and atraumatic. Nose: Nose normal.      Mouth/Throat:      Mouth: Mucous membranes are moist.      Pharynx: Oropharynx is clear. No oropharyngeal exudate. Eyes:      General:         Right eye: No discharge. Left eye: No discharge. Conjunctiva/sclera: Conjunctivae normal.      Pupils: Pupils are equal, round, and reactive to light. Cardiovascular:      Rate and Rhythm: Normal rate and regular rhythm. Chest Wall: PMI is not displaced. No thrill. Heart sounds: Normal heart sounds. No murmur heard. No friction rub. No gallop. Pulmonary:      Effort: Pulmonary effort is normal. No respiratory distress. Breath sounds: Normal breath sounds. No wheezing or rales. Chest:      Chest wall: No tenderness. Abdominal:      General: Bowel sounds are normal. There is no distension. Palpations: Abdomen is soft. There is no mass. Tenderness: There is abdominal tenderness in the periumbilical area. There is no guarding or rebound. Musculoskeletal:         General: Normal range of motion. Cervical back: Normal range of motion and neck supple. Lymphadenopathy:      Cervical: No cervical adenopathy. Skin:     General: Skin is warm and dry. Capillary Refill: Capillary refill takes less than 2 seconds. Findings: No erythema or rash.    Neurological: Mental Status: She is alert and oriented to person, place, and time. Cranial Nerves: No cranial nerve deficit. Coordination: Coordination normal.   Psychiatric:         Mood and Affect: Mood normal.         Behavior: Behavior normal.       Lab and Diagnostic Study Results   Labs -     Recent Results (from the past 12 hour(s))   URINALYSIS W/ RFLX MICROSCOPIC    Collection Time: 09/18/22  9:58 PM   Result Value Ref Range    Color Yellow/Straw      Appearance Clear Clear      Specific gravity 1.025 1.003 - 1.030      pH (UA) 5.0      Protein Negative Negative mg/dL    Glucose Negative Negative mg/dL    Ketone Negative Negative mg/dL    Bilirubin Negative Negative      Blood Negative Negative      Urobilinogen 0.1 (L) 0.2 - 1.0 EU/dL    Nitrites Negative Negative      Leukocyte Esterase Negative Negative      WBC 0-4 0 - 4 /hpf    RBC 0-5 0 - 5 /hpf    Bacteria Negative Negative /hpf    Mucus Trace (A) Negative /lpf   METABOLIC PANEL, COMPREHENSIVE    Collection Time: 09/18/22 10:42 PM   Result Value Ref Range    Sodium 142 136 - 145 mmol/L    Potassium 3.7 3.5 - 5.1 mmol/L    Chloride 110 (H) 97 - 108 mmol/L    CO2 28 21 - 32 mmol/L    Anion gap 4 (L) 5 - 15 mmol/L    Glucose 83 65 - 100 mg/dL    BUN 17 6 - 20 mg/dL    Creatinine 1.04 (H) 0.55 - 1.02 mg/dL    BUN/Creatinine ratio 16 12 - 20      GFR est AA >60 >60 ml/min/1.73m2    GFR est non-AA 60 (L) >60 ml/min/1.73m2    Calcium 9.1 8.5 - 10.1 mg/dL    Bilirubin, total 0.2 0.2 - 1.0 mg/dL    AST (SGOT) 16 15 - 37 U/L    ALT (SGPT) 31 12 - 78 U/L    Alk. phosphatase 70 45 - 117 U/L    Protein, total 6.2 (L) 6.4 - 8.2 g/dL    Albumin 3.1 (L) 3.5 - 5.0 g/dL    Globulin 3.1 2.0 - 4.0 g/dL    A-G Ratio 1.0 (L) 1.1 - 2.2         Radiologic Studies -   @lastxrresult@  CT Results  (Last 48 hours)                 09/18/22 2250  CT ABD PELV WO CONT Final result    Impression:  No evidence of acute abdominal or pelvic process.        Narrative:  EXAM: CT ABD PELV WO CONT       INDICATION: Abdominal pain       COMPARISON: February 20, 2018       IV CONTRAST: None. ORAL CONTRAST: None       TECHNIQUE:    Thin axial images were obtained through the abdomen and pelvis. Coronal and   sagittal reformats were generated. CT dose reduction was achieved through use of   a standardized protocol tailored for this examination and automatic exposure   control for dose modulation. The absence of intravenous contrast material reduces the sensitivity for   evaluation of the vasculature and solid organs. FINDINGS:    LOWER THORAX: No significant abnormality in the incidentally imaged lower chest.   LIVER: No mass. BILIARY TREE: Gallbladder is surgically absent. CBD is not dilated. SPLEEN: within normal limits. PANCREAS: No focal abnormality. ADRENALS: Unremarkable. KIDNEYS/URETERS: No calculus or hydronephrosis. STOMACH: Unremarkable. SMALL BOWEL: No dilatation or wall thickening. COLON: No dilatation or wall thickening. APPENDIX: Normal.   PERITONEUM: No ascites or pneumoperitoneum. RETROPERITONEUM: No lymphadenopathy or aortic aneurysm. REPRODUCTIVE ORGANS: Status post hysterectomy. URINARY BLADDER: No mass or calculus. BONES: No destructive bone lesion. ABDOMINAL WALL: No mass or hernia. ADDITIONAL COMMENTS: N/A                 CXR Results  (Last 48 hours)      None            Medical Decision Making and ED Course   Differential Diagnosis & Medical Decision Making Provider Note:   Will assess with basic labs and CT of the abdomen. Differential includes postoperative cyst, bowel obstruction, hernia. - I am the first provider for this patient. I reviewed the vital signs, available nursing notes, past medical history, past surgical history, family history and social history. The patients presenting problems have been discussed, and they are in agreement with the care plan formulated and outlined with them.   I have encouraged them to ask questions as they arise throughout their visit. Vital Signs-Reviewed the patient's vital signs. Patient Vitals for the past 12 hrs:   Temp Pulse Resp BP SpO2   09/18/22 2152 98.2 °F (36.8 °C) 67 16 114/61 98 %       ED Course:   ED Course as of 09/19/22 0026   Mon Sep 19, 2022   0019 To acute pathology on abdominal CT scan. We will have patient follow-up with general surgery and will treat with antiinflammatory as an outpatient [CS]      ED Course User Index  [CS] Rafal Avila MD         Procedures   Performed by: Dwaine Barney MD  Procedures      Disposition   Disposition: Condition stable    DISCHARGE PLAN:  1. Current Discharge Medication List        CONTINUE these medications which have NOT CHANGED    Details   ALPRAZolam (XANAX) 1 mg tablet Take 1 mg by mouth nightly. conjugated estrogens (PREMARIN) 1.25 mg tablet Take 1 Tablet by mouth two (2) times a day for 360 days. Qty: 60 Tablet, Refills: 11      lamoTRIgine (LAMICTAL) 25 mg tablet Take 50 mg by mouth daily. fluvoxaMINE (LUVOX) 100 mg tablet Take 100 mg by mouth every evening. ibuprofen (MOTRIN) 800 mg tablet Take  by mouth. 2.   Follow-up Information       Follow up With Specialties Details Why Contact Info    Keith Sanchez MD Colon and Rectal Surgery Call in 2 days  [address] [place] [address]  [phone]            3. Return to ED if worse   4. Current Discharge Medication List        START taking these medications    Details   naproxen (NAPROSYN) 500 mg tablet Take 1 Tablet by mouth every twelve (12) hours as needed for Pain. Qty: 20 Tablet, Refills: 0  Start date: 9/19/2022            Remove if admitted/transferred    Diagnosis/Clinical Impression     Clinical Impression:   1. Abdominal pain, generalized        Attestations: Nelida Silver MD, am the primary clinician of record.     Please note that this dictation was completed with [employer], the computer voice recognition software. Quite often unanticipated grammatical, syntax, homophones, and other interpretive errors are inadvertently transcribed by the computer software. Please disregard these errors. Please excuse any errors that have escaped final proofreading. Thank you.

## 2022-09-22 ENCOUNTER — OFFICE VISIT (OUTPATIENT)
Dept: SURGERY | Age: 37
End: 2022-09-22
Payer: MEDICAID

## 2022-09-22 VITALS
BODY MASS INDEX: 33.97 KG/M2 | DIASTOLIC BLOOD PRESSURE: 65 MMHG | TEMPERATURE: 98.4 F | SYSTOLIC BLOOD PRESSURE: 110 MMHG | WEIGHT: 199 LBS | OXYGEN SATURATION: 98 % | RESPIRATION RATE: 16 BRPM | HEART RATE: 90 BPM | HEIGHT: 64 IN

## 2022-09-22 DIAGNOSIS — R10.33 PERIUMBILICAL ABDOMINAL PAIN: ICD-10-CM

## 2022-09-22 PROCEDURE — 99203 OFFICE O/P NEW LOW 30 MIN: CPT | Performed by: COLON & RECTAL SURGERY

## 2022-09-22 RX ORDER — ESCITALOPRAM OXALATE 10 MG/1
10 TABLET ORAL DAILY
COMMUNITY
Start: 2022-09-16

## 2022-09-22 RX ORDER — TOPIRAMATE 100 MG/1
100 TABLET, FILM COATED ORAL 2 TIMES DAILY
COMMUNITY
Start: 2022-09-16

## 2022-09-22 RX ORDER — RIZATRIPTAN BENZOATE 10 MG/1
TABLET ORAL
COMMUNITY
Start: 2022-07-08

## 2022-09-22 RX ORDER — HYDROXYZINE PAMOATE 100 MG/1
CAPSULE ORAL
COMMUNITY
Start: 2022-09-16

## 2022-10-25 ENCOUNTER — HOSPITAL ENCOUNTER (EMERGENCY)
Age: 37
Discharge: HOME | End: 2022-10-25
Attending: EMERGENCY MEDICINE
Payer: MEDICAID

## 2022-10-25 VITALS
RESPIRATION RATE: 16 BRPM | HEIGHT: 64 IN | DIASTOLIC BLOOD PRESSURE: 64 MMHG | TEMPERATURE: 100 F | WEIGHT: 200 LBS | SYSTOLIC BLOOD PRESSURE: 117 MMHG | BODY MASS INDEX: 34.15 KG/M2 | OXYGEN SATURATION: 96 % | HEART RATE: 102 BPM

## 2022-10-25 DIAGNOSIS — R68.89 FLU-LIKE SYMPTOMS: Primary | ICD-10-CM

## 2022-10-25 LAB
FLUAV AG NPH QL IA: NEGATIVE
FLUBV AG NOSE QL IA: NEGATIVE

## 2022-10-25 PROCEDURE — 87804 INFLUENZA ASSAY W/OPTIC: CPT

## 2022-10-25 PROCEDURE — 74011250637 HC RX REV CODE- 250/637: Performed by: EMERGENCY MEDICINE

## 2022-10-25 PROCEDURE — 99283 EMERGENCY DEPT VISIT LOW MDM: CPT

## 2022-10-25 RX ORDER — ACETAMINOPHEN 500 MG
1000 TABLET ORAL ONCE
Status: COMPLETED | OUTPATIENT
Start: 2022-10-25 | End: 2022-10-25

## 2022-10-25 RX ORDER — DEXAMETHASONE SODIUM PHOSPHATE 10 MG/ML
10 INJECTION INTRAMUSCULAR; INTRAVENOUS ONCE
Status: COMPLETED | OUTPATIENT
Start: 2022-10-25 | End: 2022-10-25

## 2022-10-25 RX ADMIN — DEXAMETHASONE SODIUM PHOSPHATE 10 MG: 10 INJECTION, SOLUTION INTRAMUSCULAR; INTRAVENOUS at 21:17

## 2022-10-25 RX ADMIN — ACETAMINOPHEN 1000 MG: 500 TABLET ORAL at 21:17

## 2022-10-25 NOTE — Clinical Note
Adriano Gomez was seen and treated in our emergency department on 10/25/2022. Please Excuse Adriano Gomez (1985) from work until 10/31/2022.     Bonnie King MD

## 2022-10-25 NOTE — Clinical Note
[address]  [address] [facility] [address]  [phone]    Work/School Note    Date: 10/25/2022    To Whom It May concern:    Phyllis Andrea was seen and treated today in the emergency room by the following provider(s):  Attending Provider: Eagle Mena MD.      Phyllis Andrea is excused from work/school on 10/25/2022 through 10/27/2022. She is medically clear to return to work/school on 10/28/2022.          Sincerely,          Sue Granados MD

## 2022-10-26 NOTE — ED PROVIDER NOTES
EMERGENCY DEPARTMENT HISTORY AND PHYSICAL EXAM      Date: 10/25/2022  Patient Name: Valeria Norman      History of Presenting Illness     Chief Complaint   Patient presents with    Flu Like Symptoms       History Provided By: Patient    HPI: Valeria Norman, 40 y.o. female with a past medical history significant for denies presents to the ED with cc of flu-like sx. Began feeling bad yesterday at work. Today awoke w/sore throat, bodyaches, fever. Took negative home COVID test. Having runny nose and some nausea, but no cough, SOB, diarrhea, abd pain. There are no other complaints, changes, or physical findings at this time. PCP: Cata Goldman MD    Current Facility-Administered Medications   Medication Dose Route Frequency Provider Last Rate Last Admin    acetaminophen (TYLENOL) tablet 1,000 mg  1,000 mg Oral ONCE Urmila Hernandez MD        dexamethasone (PF) (DECADRON) 10 mg/mL Oral 10 mg  10 mg Oral ONCE Urmila Hernandez MD         Current Outpatient Medications   Medication Sig Dispense Refill    topiramate (TOPAMAX) 100 mg tablet Take 100 mg by mouth two (2) times a day. escitalopram oxalate (LEXAPRO) 10 mg tablet Take 10 mg by mouth daily. hydrOXYzine pamoate (VISTARIL) 100 mg capsule TAKE 1 CAPSULE BY MOUTH AT NIGHT AS NEEDED      rizatriptan (MAXALT) 10 mg tablet TAKE 1 TABLET BY MOUTH AT ONSET OF HEADACHE. REPEAT IN 2 HOURS AS NEEDED. MAXIMUM 2 TABLETS IN 24 HOURS      naproxen (NAPROSYN) 500 mg tablet Take 1 Tablet by mouth every twelve (12) hours as needed for Pain. 20 Tablet 0    ALPRAZolam (XANAX) 1 mg tablet Take 1 mg by mouth nightly. (Patient not taking: Reported on 9/22/2022)      conjugated estrogens (PREMARIN) 1.25 mg tablet Take 1 Tablet by mouth two (2) times a day for 360 days. 60 Tablet 11    lamoTRIgine (LaMICtal) 25 mg tablet Take 50 mg by mouth daily. (Patient not taking: Reported on 9/22/2022)      fluvoxaMINE (LUVOX) 100 mg tablet Take 100 mg by mouth every evening. Past History     Past Medical History:  Past Medical History:   Diagnosis Date    Aortic septal defect     Cervical cancer ([address]     Cervical high risk HPV (human papillomavirus) test positive     Chlamydia     Chronic pelvic pain in female     Dysmenorrhea 2017    Dyspareunia in female 2017    Dysplasia of cervix, low grade (KEDAR 1) 2017    Endometriosis 2005    Lupron    GERD (gastroesophageal reflux disease)     History of recurrent , antepartum     Hormone replacement therapy (HRT)     Migraine     PCOS (polycystic ovarian syndrome)     Pelvic adhesions     Wound disruption, post-op, skin 2018    Vag Cuff post intercourse 6 wk       Past Surgical History:  Past Surgical History:   Procedure Laterality Date    HX ABDOMINOPLASTY      HX CHOLECYSTECTOMY  10/22/10    HX DILATION AND CURETTAGE      HX DILATION AND CURETTAGE  2008    HX HYSTERECTOMY  2018    HX LAPAROTOMY  2009    HX LIPOSUCTION  2018    abdomen, arms, hips, sides    HX ORTHOPAEDIC Right 2017    carpal tunnel     HX OTHER SURGICAL      oral surgery    HX OTHER SURGICAL      Ingrown Toenail    HX PELVIC LAPAROSCOPY  2005    Endometriosis    HX PELVIC LAPAROSCOPY  2009    LISA   18 LISA    HX PELVIC LAPAROSCOPY  2018    Davinci LSO    HX TOTAL LAPAROSCOPIC HYSTERECTOMY W/ BS&O  2018    Davinci RSO  (LSO done 18 [facility]")       Family History:  Family History   Problem Relation Age of Onset    Diabetes Maternal Grandmother     Hypertension Maternal Grandmother     Breast Cancer Maternal Aunt 48        stage 4       Social History:  Social History     Tobacco Use    Smoking status: Every Day     Packs/day: 0.50     Years: 13.00     Pack years: 6.50     Types: Cigarettes    Smokeless tobacco: Never   Vaping Use    Vaping Use: Never used   Substance Use Topics    Alcohol use: Not Currently    Drug use: Never       Allergies:   Allergies   Allergen Reactions    Diclofenac Hives Meloxicam Hives    Amoxicillin Hives    Penicillins Hives and Swelling    Trazodone Hives and Diarrhea    Prednisone Hives and Rash         Review of Systems   Constitutional: Negative except as in HPI. Eyes: Negative except as in HPI.  ENT: Negative except as in HPI. Cardiovascular: Negative except as in HPI. Respiratory: Negative except as in HPI. Gastrointestinal: Negative except as in HPI. Genitourinary: Negative except as in HPI. Musculoskeletal: Negative except as in HPI. Integumentary: Negative except as in HPI. Neurological: Negative except as in HPI. Psychiatric: Negative except as in HPI. Endocrine: Negative except as in HPI. Hematologic/Lymphatic: Negative except as in HPI. Allergic/Immunologic: Negative except as in HPI. Physical Exam   Constitutional: Awake and alert, interactive, NAD  Eyes: PERRL, no injection or scleral icterus, no discharge  HEENT: NCAT, neck supple, MMM, b/l tonsillar exudates  CV: RRR, no m/r/g  Respiratory: CTAB, no r/r/w  GI: Abd soft, nondistended, nontender  : Deferred  MSK: FROM, no joint effusions or edema  Skin: No rashes  Neuro: CN2-12 intact, symmetric facies, fluent speech. Psych: Well-groomed, normal speech, behavior, appropriate mood  Lymph: No LAD    Lab and Diagnostic Study Results     Labs -   No results found for this or any previous visit (from the past 12 hour(s)). Radiologic Studies -   [unfilled]  CT Results  (Last 48 hours)      None          CXR Results  (Last 48 hours)      None            Medical Decision Making and ED Course   - I am the first and primary provider for this patient AND AM THE PRIMARY PROVIDER OF RECORD. - I reviewed the vital signs, available nursing notes, past medical history, past surgical history, family history and social history. - Initial assessment performed. The patients presenting problems have been discussed, and the staff are in agreement with the care plan formulated and outlined with them.   I have encouraged them to ask questions as they arise throughout their visit. Vital Signs-Reviewed the patient's vital signs. Patient Vitals for the past 12 hrs:   Temp Pulse Resp BP SpO2   10/25/22 2044 100 °F (37.8 °C) (!) 102 16 117/64 96 %       EKG interpretation:         Provider Notes (Medical Decision Making):   37F w/flu-like sx, pharyngitis. CENTOR 2, will defer swab given URI sx and age. Will give decadron and tylenol for pharyngitis. Swab for flu per pt preference but declining tamiflu, will f/up on MyChart. Return precautions discussed. ED Course:              Disposition     Disposition: DC- Adult Discharges: All of the diagnostic tests were reviewed and questions answered. Diagnosis, care plan and treatment options were discussed. The patient understands the instructions and will follow up as directed. The patients results have been reviewed with them. They have been counseled regarding their diagnosis. The patient verbally convey understanding and agreement of the signs, symptoms, diagnosis, treatment and prognosis and additionally agrees to follow up as recommended with their PCP in 24 - 48 hours. They also agree with the care-plan and convey that all of their questions have been answered. I have also put together some discharge instructions for them that include: 1) educational information regarding their diagnosis, 2) how to care for their diagnosis at home, as well a 3) list of reasons why they would want to return to the ED prior to their follow-up appointment, should their condition change. Discharged      Diagnosis     Clinical Impression:   1. Flu-like symptoms        Attestations:     Casa Sanchez MD

## 2022-10-26 NOTE — DISCHARGE INSTRUCTIONS
You were seen in the ER for your Flu-like symptoms. You can follow up on your swab results using your MyChart. Thankfully, your vital signs are all normal, including your oxygen and your heart rate. We gave you a steroid to drink that should help your sore throat for the next few days. You should take tylenol or ibuprofen for fever, aches and pains for the next few days. You can alternate these every 3 hours so that you always have something on board. For instance, you can take tylenol at 9am, ibuprofen at noon, tylenol again at 3pm and ibuprofen again at 6pm. Take in plenty of water to stay hydrated and follow up with your primary care doctor in the next few days. Return to the ER for any uncontrollable vomiting or diarrhea, difficulty breathing, or any other new or concerning symptoms. Thank you! Thank you for allowing me to care for you in the emergency department. It is my goal to provide you with excellent care. If you have not received excellent quality care, please ask to speak to the nurse manager. Please fill out the survey that will come to you by mail or email since we listen to your feedback! Below you will find a list of your tests from today's visit. Should you have any questions, please do not hesitate to call the emergency department. Labs  No results found for this or any previous visit (from the past 12 hour(s)). Radiologic Studies  No orders to display     CT Results  (Last 48 hours)      None          CXR Results  (Last 48 hours)      None          ------------------------------------------------------------------------------------------------------------  The exam and treatment you received in the Emergency Department were for an urgent problem and are not intended as complete care.  It is important that you follow-up with a doctor, nurse practitioner, or physician assistant to:  (1) confirm your diagnosis,  (2) re-evaluation of changes in your illness and treatment, and  (3) for ongoing care. Please take your discharge instructions with you when you go to your follow-up appointment. If you have any problem arranging a follow-up appointment, contact the Emergency Department. If your symptoms become worse or you do not improve as expected and you are unable to reach your health care provider, please return to the Emergency Department. We are available 24 hours a day. If a prescription has been provided, please have it filled as soon as possible to prevent a delay in treatment. If you have any questions or reservations about taking the medication due to side effects or interactions with other medications, please call your primary care provider or contact the ER.

## 2023-05-05 PROBLEM — R10.33 PERIUMBILICAL ABDOMINAL PAIN: Status: ACTIVE | Noted: 2023-05-05

## 2024-01-03 ENCOUNTER — APPOINTMENT (OUTPATIENT)
Facility: HOSPITAL | Age: 39
End: 2024-01-03
Payer: MEDICAID

## 2024-01-03 ENCOUNTER — HOSPITAL ENCOUNTER (EMERGENCY)
Facility: HOSPITAL | Age: 39
Discharge: HOME | End: 2024-01-03
Payer: MEDICAID

## 2024-01-03 VITALS
WEIGHT: 198 LBS | DIASTOLIC BLOOD PRESSURE: 64 MMHG | HEIGHT: 64 IN | BODY MASS INDEX: 33.8 KG/M2 | TEMPERATURE: 98.3 F | SYSTOLIC BLOOD PRESSURE: 117 MMHG | RESPIRATION RATE: 18 BRPM | OXYGEN SATURATION: 99 % | HEART RATE: 56 BPM

## 2024-01-03 DIAGNOSIS — A05.9 FOOD POISONING: ICD-10-CM

## 2024-01-03 DIAGNOSIS — K52.9 COLITIS: Primary | ICD-10-CM

## 2024-01-03 LAB
ALBUMIN SERPL-MCNC: 3.7 G/DL (ref 3.5–5)
ALBUMIN/GLOB SERPL: 1.2 (ref 1.1–2.2)
ALP SERPL-CCNC: 70 U/L (ref 45–117)
ALT SERPL-CCNC: 30 U/L (ref 12–78)
ANION GAP SERPL CALC-SCNC: 3 MMOL/L (ref 5–15)
AST SERPL W P-5'-P-CCNC: 15 U/L (ref 15–37)
BASOPHILS # BLD: 0 K/UL (ref 0–0.1)
BASOPHILS NFR BLD: 0 % (ref 0–1)
BILIRUB SERPL-MCNC: 0.7 MG/DL (ref 0.2–1)
BUN SERPL-MCNC: 13 MG/DL (ref 6–20)
BUN/CREAT SERPL: 13 (ref 12–20)
CA-I BLD-MCNC: 9.8 MG/DL (ref 8.5–10.1)
CHLORIDE SERPL-SCNC: 114 MMOL/L (ref 97–108)
CO2 SERPL-SCNC: 25 MMOL/L (ref 21–32)
CREAT SERPL-MCNC: 0.98 MG/DL (ref 0.55–1.02)
DIFFERENTIAL METHOD BLD: ABNORMAL
EOSINOPHIL # BLD: 0.1 K/UL (ref 0–0.4)
EOSINOPHIL NFR BLD: 1 % (ref 0–7)
ERYTHROCYTE [DISTWIDTH] IN BLOOD BY AUTOMATED COUNT: 14.5 % (ref 11.5–14.5)
GLOBULIN SER CALC-MCNC: 3.2 G/DL (ref 2–4)
GLUCOSE SERPL-MCNC: 100 MG/DL (ref 65–100)
HCT VFR BLD AUTO: 44.5 % (ref 35–47)
HGB BLD-MCNC: 14.5 G/DL (ref 11.5–16)
IMM GRANULOCYTES # BLD AUTO: 0 K/UL (ref 0–0.04)
IMM GRANULOCYTES NFR BLD AUTO: 0 % (ref 0–0.5)
LIPASE SERPL-CCNC: 25 U/L (ref 13–75)
LYMPHOCYTES # BLD: 1.3 K/UL (ref 0.8–3.5)
LYMPHOCYTES NFR BLD: 12 % (ref 12–49)
MCH RBC QN AUTO: 27.5 PG (ref 26–34)
MCHC RBC AUTO-ENTMCNC: 32.6 G/DL (ref 30–36.5)
MCV RBC AUTO: 84.4 FL (ref 80–99)
MONOCYTES # BLD: 0.3 K/UL (ref 0–1)
MONOCYTES NFR BLD: 3 % (ref 5–13)
NEUTS SEG # BLD: 9.2 K/UL (ref 1.8–8)
NEUTS SEG NFR BLD: 84 % (ref 32–75)
NRBC # BLD: 0 K/UL (ref 0–0.01)
NRBC BLD-RTO: 0 PER 100 WBC
PLATELET # BLD AUTO: 278 K/UL (ref 150–400)
PMV BLD AUTO: 11.8 FL (ref 8.9–12.9)
POTASSIUM SERPL-SCNC: 3.9 MMOL/L (ref 3.5–5.1)
PROT SERPL-MCNC: 6.9 G/DL (ref 6.4–8.2)
RBC # BLD AUTO: 5.27 M/UL (ref 3.8–5.2)
SODIUM SERPL-SCNC: 142 MMOL/L (ref 136–145)
WBC # BLD AUTO: 11 K/UL (ref 3.6–11)

## 2024-01-03 PROCEDURE — 6360000004 HC RX CONTRAST MEDICATION

## 2024-01-03 PROCEDURE — 6360000002 HC RX W HCPCS

## 2024-01-03 PROCEDURE — 74177 CT ABD & PELVIS W/CONTRAST: CPT

## 2024-01-03 PROCEDURE — 96376 TX/PRO/DX INJ SAME DRUG ADON: CPT

## 2024-01-03 PROCEDURE — 80053 COMPREHEN METABOLIC PANEL: CPT

## 2024-01-03 PROCEDURE — 99285 EMERGENCY DEPT VISIT HI MDM: CPT

## 2024-01-03 PROCEDURE — 85025 COMPLETE CBC W/AUTO DIFF WBC: CPT

## 2024-01-03 PROCEDURE — 96372 THER/PROPH/DIAG INJ SC/IM: CPT

## 2024-01-03 PROCEDURE — 6370000000 HC RX 637 (ALT 250 FOR IP): Performed by: STUDENT IN AN ORGANIZED HEALTH CARE EDUCATION/TRAINING PROGRAM

## 2024-01-03 PROCEDURE — 96375 TX/PRO/DX INJ NEW DRUG ADDON: CPT

## 2024-01-03 PROCEDURE — 2580000003 HC RX 258

## 2024-01-03 PROCEDURE — 83690 ASSAY OF LIPASE: CPT

## 2024-01-03 PROCEDURE — 36415 COLL VENOUS BLD VENIPUNCTURE: CPT

## 2024-01-03 PROCEDURE — 96374 THER/PROPH/DIAG INJ IV PUSH: CPT

## 2024-01-03 PROCEDURE — 6370000000 HC RX 637 (ALT 250 FOR IP)

## 2024-01-03 RX ORDER — ONDANSETRON 2 MG/ML
4 INJECTION INTRAMUSCULAR; INTRAVENOUS
Status: COMPLETED | OUTPATIENT
Start: 2024-01-03 | End: 2024-01-03

## 2024-01-03 RX ORDER — METOCLOPRAMIDE HYDROCHLORIDE 5 MG/ML
10 INJECTION INTRAMUSCULAR; INTRAVENOUS ONCE
Status: COMPLETED | OUTPATIENT
Start: 2024-01-03 | End: 2024-01-03

## 2024-01-03 RX ORDER — PROMETHAZINE HYDROCHLORIDE 25 MG/1
25 TABLET ORAL ONCE
Status: COMPLETED | OUTPATIENT
Start: 2024-01-03 | End: 2024-01-03

## 2024-01-03 RX ORDER — ONDANSETRON 4 MG/1
4 TABLET, ORALLY DISINTEGRATING ORAL ONCE
Status: DISCONTINUED | OUTPATIENT
Start: 2024-01-03 | End: 2024-01-04 | Stop reason: HOSPADM

## 2024-01-03 RX ORDER — PROMETHAZINE HYDROCHLORIDE 12.5 MG/1
12.5 TABLET ORAL 4 TIMES DAILY PRN
Qty: 20 TABLET | Refills: 0 | Status: SHIPPED | OUTPATIENT
Start: 2024-01-03 | End: 2024-01-10

## 2024-01-03 RX ORDER — MORPHINE SULFATE 4 MG/ML
4 INJECTION, SOLUTION INTRAMUSCULAR; INTRAVENOUS
Status: COMPLETED | OUTPATIENT
Start: 2024-01-03 | End: 2024-01-03

## 2024-01-03 RX ORDER — 0.9 % SODIUM CHLORIDE 0.9 %
1000 INTRAVENOUS SOLUTION INTRAVENOUS ONCE
Status: COMPLETED | OUTPATIENT
Start: 2024-01-03 | End: 2024-01-03

## 2024-01-03 RX ORDER — DICYCLOMINE HYDROCHLORIDE 10 MG/ML
20 INJECTION INTRAMUSCULAR
Status: COMPLETED | OUTPATIENT
Start: 2024-01-03 | End: 2024-01-03

## 2024-01-03 RX ORDER — DICYCLOMINE HCL 20 MG
20 TABLET ORAL 4 TIMES DAILY
Qty: 28 TABLET | Refills: 0 | Status: SHIPPED | OUTPATIENT
Start: 2024-01-03 | End: 2024-01-10

## 2024-01-03 RX ORDER — HYDROMORPHONE HYDROCHLORIDE 1 MG/ML
0.5 INJECTION, SOLUTION INTRAMUSCULAR; INTRAVENOUS; SUBCUTANEOUS
Status: DISCONTINUED | OUTPATIENT
Start: 2024-01-03 | End: 2024-01-03

## 2024-01-03 RX ADMIN — PROMETHAZINE HYDROCHLORIDE 25 MG: 25 TABLET ORAL at 23:11

## 2024-01-03 RX ADMIN — IOPAMIDOL 100 ML: 755 INJECTION, SOLUTION INTRAVENOUS at 21:08

## 2024-01-03 RX ADMIN — MORPHINE SULFATE 4 MG: 4 INJECTION, SOLUTION INTRAMUSCULAR; INTRAVENOUS at 19:32

## 2024-01-03 RX ADMIN — SODIUM CHLORIDE 1000 ML: 9 INJECTION, SOLUTION INTRAVENOUS at 19:00

## 2024-01-03 RX ADMIN — ONDANSETRON 4 MG: 2 INJECTION INTRAMUSCULAR; INTRAVENOUS at 20:05

## 2024-01-03 RX ADMIN — DICYCLOMINE HYDROCHLORIDE 20 MG: 10 INJECTION, SOLUTION INTRAMUSCULAR at 20:07

## 2024-01-03 RX ADMIN — METOCLOPRAMIDE 10 MG: 5 INJECTION, SOLUTION INTRAMUSCULAR; INTRAVENOUS at 19:32

## 2024-01-03 RX ADMIN — MORPHINE SULFATE 4 MG: 4 INJECTION, SOLUTION INTRAMUSCULAR; INTRAVENOUS at 21:15

## 2024-01-03 ASSESSMENT — PAIN DESCRIPTION - LOCATION
LOCATION: ABDOMEN
LOCATION: ABDOMEN

## 2024-01-03 ASSESSMENT — PAIN SCALES - GENERAL
PAINLEVEL_OUTOF10: 10
PAINLEVEL_OUTOF10: 10
PAINLEVEL_OUTOF10: 8

## 2024-01-03 ASSESSMENT — LIFESTYLE VARIABLES
HOW MANY STANDARD DRINKS CONTAINING ALCOHOL DO YOU HAVE ON A TYPICAL DAY: PATIENT DOES NOT DRINK
HOW OFTEN DO YOU HAVE A DRINK CONTAINING ALCOHOL: NEVER
HOW OFTEN DO YOU HAVE A DRINK CONTAINING ALCOHOL: NEVER

## 2024-01-03 ASSESSMENT — PAIN - FUNCTIONAL ASSESSMENT: PAIN_FUNCTIONAL_ASSESSMENT: 0-10

## 2024-01-03 NOTE — ED PROVIDER NOTES
[facility] EMERGENCY DEPT  EMERGENCY DEPARTMENT HISTORY AND PHYSICAL EXAM      Date: 1/3/2024  Patient Name: Padma Shah  MRN: [id number]  YOB: 1985  Date of evaluation: 1/3/2024  Provider: SUDHA Villanueva NP   Note Started: 6:44 PM EST 1/3/24    HISTORY OF PRESENT ILLNESS     Chief Complaint   Patient presents with    Emesis       History Provided By: Patient    HPI: Padma Shah is a 38 y.o. female with past medical history as listed below, presents ambulatory into emergency department with complaints of generalized abdominal pain, nausea, vomiting, diarrhea since 6 AM, after eating steak for dinner and again at 12 AM.  Denies fever/chills, chest pain, difficulty breathing, urinary symptoms, rash. Upon arrival to the ED pt is alert and oriented x 3, well-appearing, and interacting appropriately; no obvious distress noted.    PAST MEDICAL HISTORY   Past Medical History:  Past Medical History:   Diagnosis Date    Aortic septal defect     Cervical cancer (HCC)     Cervical high risk HPV (human papillomavirus) test positive     Chlamydia     Chronic pelvic pain in female     Dysmenorrhea 2017    Dyspareunia in female 2017    Dysplasia of cervix, low grade (JOE 1) 2017    Endometriosis 2005    Lupron    GERD (gastroesophageal reflux disease)     History of recurrent , antepartum     Hormone replacement therapy (HRT)     Migraine     PCOS (polycystic ovarian syndrome)     Pelvic adhesions     Wound disruption, post-op, skin 2018    Vag Cuff post intercourse 6 wk       Past Surgical History:  Past Surgical History:   Procedure Laterality Date    ABDOMINOPLASTY  2019    CHOLECYSTECTOMY  10/22/10    DILATION AND CURETTAGE OF UTERUS      DILATION AND CURETTAGE OF UTERUS  2008    HYSTERECTOMY (CERVIX STATUS UNKNOWN)  2018    LAPAROTOMY  2009    LIPOSUCTION  2018    abdomen, arms, hips, sides    ORTHOPEDIC SURGERY Right 2017    carpal tunnel     OTHER SURGICAL HISTORY

## 2024-01-03 NOTE — ED TRIAGE NOTES
Believes she may have food poisoning since 10am after eating steak early morning hours (mn).  Also c/o diarrhea.

## (undated) DEVICE — BANDAGE,GAUZE,CONFORMING,3"X75",STRL,LF: Brand: MEDLINE

## (undated) DEVICE — NEEDLE HYPO 18GA L1.5IN PNK S STL HUB POLYPR SHLD REG BVL

## (undated) DEVICE — SUTURE VCRL SZ 4-0 L27IN ABSRB UD L19MM FS-2 3/8 CIR REV J422H

## (undated) DEVICE — HOOK LOCK LATEX FREE ELASTIC BANDAGE 2INX5YD

## (undated) DEVICE — SOLUTION IV 1000ML 0.9% SOD CHL

## (undated) DEVICE — HANDLE LT SNAP ON ULT DURABLE LENS FOR TRUMPF ALC DISPOSABLE

## (undated) DEVICE — INFECTION CONTROL KIT SYS

## (undated) DEVICE — CONTAINER,SPECIMEN,3OZ,OR STRL: Brand: MEDLINE

## (undated) DEVICE — TOURNIQUET PHLEB L EXSANGUINATING FOR AD DGT TOURNI-COT

## (undated) DEVICE — SYR 10ML LUER LOK 1/5ML GRAD --

## (undated) DEVICE — NEEDLE HYPO 25GA L1.5IN BVL ORIENTED ECLIPSE

## (undated) DEVICE — PREP SKN CHLRAPRP 26ML TNT -- CONVERT TO ITEM 373320

## (undated) DEVICE — CURITY AMD ANTIMICROBIAL PACKING STRIPS: Brand: CURITY

## (undated) DEVICE — Device

## (undated) DEVICE — GAUZE SPONGES,12 PLY: Brand: CURITY

## (undated) DEVICE — SPONGE GZ W4XL4IN COT 12 PLY TYP VII WVN C FLD DSGN

## (undated) DEVICE — INTENDED FOR TISSUE SEPARATION, AND OTHER PROCEDURES THAT REQUIRE A SHARP SURGICAL BLADE TO PUNCTURE OR CUT.: Brand: BARD-PARKER ® CARBON RIB-BACK BLADES

## (undated) DEVICE — BLADE OPHTH MINI BEAV SHRP --

## (undated) DEVICE — TOWEL,OR,DSP,ST,BLUE,STD,2/PK,40PK/CS: Brand: MEDLINE

## (undated) DEVICE — STERILE POLYISOPRENE POWDER-FREE SURGICAL GLOVES: Brand: PROTEXIS

## (undated) DEVICE — SUTURE NONABSORBABLE MONOFILAMENT 5-0 PS-2 18 IN BLK ETHILON 1666H

## (undated) DEVICE — STRETCH BANDAGE ROLL: Brand: DERMACEA

## (undated) DEVICE — BANDAGE COMPR 9 FTX4 IN SMOOTH COMFORTABLE SYNTH ESMRK LF

## (undated) DEVICE — TOWEL SURG W17XL27IN STD BLU COT NONFENESTRATED PREWASHED

## (undated) DEVICE — SUTURE ETHLN SZ 4-0 L18IN NONABSORBABLE BLK L19MM PS-2 3/8 1667H

## (undated) DEVICE — DRAPE,EXTREMITY,89X128,STERILE: Brand: MEDLINE

## (undated) DEVICE — SWAB SURG L76CM COT ROUNDED PT TIP VISISWAB

## (undated) DEVICE — STOCKINETTE: Brand: DEROYAL

## (undated) DEVICE — DRESSING,GAUZE,XEROFORM,CURAD,1"X8",ST: Brand: CURAD

## (undated) DEVICE — SYR 3ML LL TIP 1/10ML GRAD --

## (undated) DEVICE — IODOFORM PACKING STRIP: Brand: CURITY

## (undated) DEVICE — BNDG ELAS HK LOOP 3X5YD NS -- MATRIX

## (undated) DEVICE — ZIMMER® STERILE DISPOSABLE TOURNIQUET CUFF WITH PLC, DUAL PORT, SINGLE BLADDER, 18 IN. (46 CM)

## (undated) DEVICE — SKIN MARKER,REGULAR TIP WITH RULER AND LABELS: Brand: DEVON